# Patient Record
Sex: FEMALE | Race: WHITE | NOT HISPANIC OR LATINO | Employment: UNEMPLOYED | URBAN - METROPOLITAN AREA
[De-identification: names, ages, dates, MRNs, and addresses within clinical notes are randomized per-mention and may not be internally consistent; named-entity substitution may affect disease eponyms.]

---

## 2018-06-08 ENCOUNTER — OFFICE VISIT CONVERTED (OUTPATIENT)
Dept: FAMILY MEDICINE CLINIC | Facility: CLINIC | Age: 38
End: 2018-06-08
Attending: NURSE PRACTITIONER

## 2018-06-18 ENCOUNTER — OFFICE VISIT CONVERTED (OUTPATIENT)
Dept: FAMILY MEDICINE CLINIC | Facility: CLINIC | Age: 38
End: 2018-06-18
Attending: NURSE PRACTITIONER

## 2018-06-18 ENCOUNTER — CONVERSION ENCOUNTER (OUTPATIENT)
Dept: FAMILY MEDICINE CLINIC | Facility: CLINIC | Age: 38
End: 2018-06-18

## 2018-08-22 ENCOUNTER — OFFICE VISIT CONVERTED (OUTPATIENT)
Dept: FAMILY MEDICINE CLINIC | Facility: CLINIC | Age: 38
End: 2018-08-22
Attending: NURSE PRACTITIONER

## 2019-04-22 ENCOUNTER — HOSPITAL ENCOUNTER (OUTPATIENT)
Dept: LAB | Facility: HOSPITAL | Age: 39
Discharge: HOME | End: 2019-04-22
Attending: NURSE PRACTITIONER

## 2019-04-22 ENCOUNTER — CONVERSION ENCOUNTER (OUTPATIENT)
Dept: FAMILY MEDICINE CLINIC | Facility: CLINIC | Age: 39
End: 2019-04-22

## 2019-04-22 ENCOUNTER — OFFICE VISIT CONVERTED (OUTPATIENT)
Dept: FAMILY MEDICINE CLINIC | Facility: CLINIC | Age: 39
End: 2019-04-22
Attending: NURSE PRACTITIONER

## 2019-04-22 LAB
ALBUMIN SERPL-MCNC: 3.7 G/DL (ref 3.5–5)
ALBUMIN/GLOB SERPL: 1.1 {RATIO} (ref 1.4–2.6)
ALP SERPL-CCNC: 92 U/L (ref 42–98)
ALT SERPL-CCNC: 12 U/L (ref 10–40)
ANION GAP SERPL CALC-SCNC: 15 MMOL/L (ref 8–19)
AST SERPL-CCNC: 14 U/L (ref 15–50)
BASOPHILS # BLD AUTO: 0.04 10*3/UL (ref 0–0.2)
BASOPHILS NFR BLD AUTO: 0.7 % (ref 0–3)
BILIRUB SERPL-MCNC: 0.37 MG/DL (ref 0.2–1.3)
BUN SERPL-MCNC: 11 MG/DL (ref 5–25)
BUN/CREAT SERPL: 14 {RATIO} (ref 6–20)
CALCIUM SERPL-MCNC: 8.9 MG/DL (ref 8.7–10.4)
CHLORIDE SERPL-SCNC: 103 MMOL/L (ref 99–111)
CONV ABS IMM GRAN: 0.01 10*3/UL (ref 0–0.2)
CONV CO2: 25 MMOL/L (ref 22–32)
CONV IMMATURE GRAN: 0.2 % (ref 0–1.8)
CONV TOTAL PROTEIN: 7.1 G/DL (ref 6.3–8.2)
CREAT UR-MCNC: 0.77 MG/DL (ref 0.5–0.9)
DEPRECATED RDW RBC AUTO: 46 FL (ref 36.4–46.3)
EOSINOPHIL # BLD AUTO: 0.15 10*3/UL (ref 0–0.7)
EOSINOPHIL # BLD AUTO: 2.5 % (ref 0–7)
ERYTHROCYTE [DISTWIDTH] IN BLOOD BY AUTOMATED COUNT: 13.6 % (ref 11.7–14.4)
EST. AVERAGE GLUCOSE BLD GHB EST-MCNC: 105 MG/DL
GFR SERPLBLD BASED ON 1.73 SQ M-ARVRAT: >60 ML/MIN/{1.73_M2}
GLOBULIN UR ELPH-MCNC: 3.4 G/DL (ref 2–3.5)
GLUCOSE SERPL-MCNC: 86 MG/DL (ref 65–99)
HBA1C MFR BLD: 13.1 G/DL (ref 12–16)
HBA1C MFR BLD: 5.3 % (ref 3.5–5.7)
HCT VFR BLD AUTO: 41.2 % (ref 37–47)
LYMPHOCYTES # BLD AUTO: 2.37 10*3/UL (ref 1–5)
MCH RBC QN AUTO: 29.4 PG (ref 27–31)
MCHC RBC AUTO-ENTMCNC: 31.8 G/DL (ref 33–37)
MCV RBC AUTO: 92.4 FL (ref 81–99)
MONOCYTES # BLD AUTO: 0.27 10*3/UL (ref 0.2–1.2)
MONOCYTES NFR BLD AUTO: 4.5 % (ref 3–10)
NEUTROPHILS # BLD AUTO: 3.16 10*3/UL (ref 2–8)
NEUTROPHILS NFR BLD AUTO: 52.6 % (ref 30–85)
NRBC CBCN: 0 % (ref 0–0.7)
OSMOLALITY SERPL CALC.SUM OF ELEC: 287 MOSM/KG (ref 273–304)
PLATELET # BLD AUTO: 201 10*3/UL (ref 130–400)
PMV BLD AUTO: 10.7 FL (ref 9.4–12.3)
POTASSIUM SERPL-SCNC: 3.7 MMOL/L (ref 3.5–5.3)
RBC # BLD AUTO: 4.46 10*6/UL (ref 4.2–5.4)
SODIUM SERPL-SCNC: 139 MMOL/L (ref 135–147)
T4 FREE SERPL-MCNC: 0.9 NG/DL (ref 0.9–1.8)
TSH SERPL-ACNC: 1.87 M[IU]/L (ref 0.27–4.2)
VARIANT LYMPHS NFR BLD MANUAL: 39.5 % (ref 20–45)
WBC # BLD AUTO: 6 10*3/UL (ref 4.8–10.8)

## 2019-04-23 LAB — INSULIN SERPL-ACNC: 7 UIU/ML (ref 2.6–24.9)

## 2020-06-29 ENCOUNTER — OFFICE VISIT CONVERTED (OUTPATIENT)
Dept: FAMILY MEDICINE CLINIC | Facility: CLINIC | Age: 40
End: 2020-06-29
Attending: NURSE PRACTITIONER

## 2020-06-29 ENCOUNTER — CONVERSION ENCOUNTER (OUTPATIENT)
Dept: FAMILY MEDICINE CLINIC | Facility: CLINIC | Age: 40
End: 2020-06-29

## 2020-06-30 ENCOUNTER — HOSPITAL ENCOUNTER (OUTPATIENT)
Dept: LAB | Facility: HOSPITAL | Age: 40
Discharge: HOME | End: 2020-06-30
Attending: NURSE PRACTITIONER

## 2020-06-30 LAB
ALBUMIN SERPL-MCNC: 3.6 G/DL (ref 3.5–5)
ALBUMIN/GLOB SERPL: 1.1 {RATIO} (ref 1.4–2.6)
ALP SERPL-CCNC: 83 U/L (ref 42–98)
ALT SERPL-CCNC: 13 U/L (ref 10–40)
ANION GAP SERPL CALC-SCNC: 22 MMOL/L (ref 8–19)
AST SERPL-CCNC: 15 U/L (ref 15–50)
BASOPHILS # BLD AUTO: 0.04 10*3/UL (ref 0–0.2)
BASOPHILS NFR BLD AUTO: 0.7 % (ref 0–3)
BILIRUB SERPL-MCNC: 0.19 MG/DL (ref 0.2–1.3)
BUN SERPL-MCNC: 11 MG/DL (ref 5–25)
BUN/CREAT SERPL: 15 {RATIO} (ref 6–20)
CALCIUM SERPL-MCNC: 9.1 MG/DL (ref 8.7–10.4)
CHLORIDE SERPL-SCNC: 104 MMOL/L (ref 99–111)
CHOLEST SERPL-MCNC: 177 MG/DL (ref 107–200)
CHOLEST/HDLC SERPL: 3.9 {RATIO} (ref 3–6)
CONV ABS IMM GRAN: 0.01 10*3/UL (ref 0–0.2)
CONV CO2: 20 MMOL/L (ref 22–32)
CONV IMMATURE GRAN: 0.2 % (ref 0–1.8)
CONV TOTAL PROTEIN: 7 G/DL (ref 6.3–8.2)
CREAT UR-MCNC: 0.71 MG/DL (ref 0.5–0.9)
DEPRECATED RDW RBC AUTO: 47 FL (ref 36.4–46.3)
EOSINOPHIL # BLD AUTO: 0.14 10*3/UL (ref 0–0.7)
EOSINOPHIL # BLD AUTO: 2.3 % (ref 0–7)
ERYTHROCYTE [DISTWIDTH] IN BLOOD BY AUTOMATED COUNT: 14.4 % (ref 11.7–14.4)
FOLATE SERPL-MCNC: 6.1 NG/ML (ref 4.8–20)
GFR SERPLBLD BASED ON 1.73 SQ M-ARVRAT: >60 ML/MIN/{1.73_M2}
GLOBULIN UR ELPH-MCNC: 3.4 G/DL (ref 2–3.5)
GLUCOSE SERPL-MCNC: 97 MG/DL (ref 65–99)
HCT VFR BLD AUTO: 39.6 % (ref 37–47)
HDLC SERPL-MCNC: 45 MG/DL (ref 40–60)
HGB BLD-MCNC: 12.3 G/DL (ref 12–16)
IRON SATN MFR SERPL: 13 % (ref 20–55)
IRON SERPL-MCNC: 43 UG/DL (ref 60–170)
LDLC SERPL CALC-MCNC: 110 MG/DL (ref 70–100)
LYMPHOCYTES # BLD AUTO: 1.99 10*3/UL (ref 1–5)
LYMPHOCYTES NFR BLD AUTO: 32.4 % (ref 20–45)
MCH RBC QN AUTO: 28 PG (ref 27–31)
MCHC RBC AUTO-ENTMCNC: 31.1 G/DL (ref 33–37)
MCV RBC AUTO: 90 FL (ref 81–99)
MONOCYTES # BLD AUTO: 0.28 10*3/UL (ref 0.2–1.2)
MONOCYTES NFR BLD AUTO: 4.6 % (ref 3–10)
NEUTROPHILS # BLD AUTO: 3.68 10*3/UL (ref 2–8)
NEUTROPHILS NFR BLD AUTO: 59.8 % (ref 30–85)
NRBC CBCN: 0 % (ref 0–0.7)
OSMOLALITY SERPL CALC.SUM OF ELEC: 293 MOSM/KG (ref 273–304)
PLATELET # BLD AUTO: 247 10*3/UL (ref 130–400)
PMV BLD AUTO: 11.1 FL (ref 9.4–12.3)
POTASSIUM SERPL-SCNC: 4.1 MMOL/L (ref 3.5–5.3)
RBC # BLD AUTO: 4.4 10*6/UL (ref 4.2–5.4)
SODIUM SERPL-SCNC: 142 MMOL/L (ref 135–147)
TIBC SERPL-MCNC: 330 UG/DL (ref 245–450)
TRANSFERRIN SERPL-MCNC: 231 MG/DL (ref 250–380)
TRIGL SERPL-MCNC: 110 MG/DL (ref 40–150)
TSH SERPL-ACNC: 3.39 M[IU]/L (ref 0.27–4.2)
VIT B12 SERPL-MCNC: 418 PG/ML (ref 211–911)
VLDLC SERPL-MCNC: 22 MG/DL (ref 5–37)
WBC # BLD AUTO: 6.14 10*3/UL (ref 4.8–10.8)

## 2020-07-01 LAB — 25(OH)D3 SERPL-MCNC: 35.7 NG/ML (ref 30–100)

## 2020-07-03 LAB
CMV IGG CSF IA-ACNC: >10 U/ML
CMV IGM SERPL IA-ACNC: <8 AU/ML
CONV EBV EARLY ANTIGEN: 33.8 U/ML (ref 0–10.9)
CONV EBV NUCLEAR ANTIGEN: 143 U/ML (ref 0–21.9)
CONV EPSTEIN BARR VIRAL CAPSID IGM: <10 U/ML (ref 0–43.9)
CONV EPSTEIN BARR VIRUS ANTIBODY TO VIRAL CAPSID IGG: 496 U/ML (ref 0–21.9)

## 2021-03-19 ENCOUNTER — CONVERSION ENCOUNTER (OUTPATIENT)
Dept: FAMILY MEDICINE CLINIC | Facility: CLINIC | Age: 41
End: 2021-03-19

## 2021-03-19 ENCOUNTER — OFFICE VISIT CONVERTED (OUTPATIENT)
Dept: FAMILY MEDICINE CLINIC | Facility: CLINIC | Age: 41
End: 2021-03-19
Attending: NURSE PRACTITIONER

## 2021-04-14 ENCOUNTER — HOSPITAL ENCOUNTER (OUTPATIENT)
Dept: GENERAL RADIOLOGY | Facility: HOSPITAL | Age: 41
Discharge: HOME | End: 2021-04-14
Attending: NURSE PRACTITIONER

## 2021-04-16 ENCOUNTER — OFFICE VISIT CONVERTED (OUTPATIENT)
Dept: FAMILY MEDICINE CLINIC | Facility: CLINIC | Age: 41
End: 2021-04-16
Attending: NURSE PRACTITIONER

## 2021-05-13 NOTE — PROGRESS NOTES
Progress Note      Patient Name: Leonie Bates   Patient ID: [id number]   Sex: Female   YOB: 1980        Visit Date: June 29, 2020    Provider: GABINO Grover   Location: [facility]   Location Address: [address] [place] [address], [address] [place] [address]  [id number]   Location Phone: [phone]          Chief Complaint  · CPX  · Patient has been experiencing fatigue over the last 2-3 months.      History Of Present Illness  Leonie Bates is a 39 year old /White female who presents for evaluation and treatment of:      Presents to the office today for wellness physical.  Patient also states that she has been having generalized fatigue for the past 2 months.  Patient states that she has not had labs in over a year and like to have these completed.  Patient does state that she has been on CPAP in the past but after having her gastric sleeve surgery and losing significant mental weight she stopped utilizing it.  She states that she is waking up now still fatigued and is having to take naps throughout the day.       Past Medical History  Disease Name Date Onset Notes   Depression --  --    Eczema 06/08/2018 --    Fatigue 06/08/2018 --    Major depressive disorder 06/08/2018 --    Migraine Headaches --  --    Psychiatric Care --  --    Skin Disease --  --          Past Surgical History  Procedure Name Date Notes   Gastric Sleeve --  --    Tubal ligation --  --          Medication List  Name Date Started Instructions   clotrimazole-betamethasone 1-0.05 % topical cream 04/22/2019 apply to the affected and surrounding areas of skin by topical route 2 times per day in the morning and evening         Family Medical History  Disease Name Relative/Age Notes   Diabetes Mellitus, Type II  --          Social History  Finding Status Start/Stop Quantity Notes   Alcohol Never --/-- --  --    Tobacco Never --/-- --  --          Review of Systems  · Constitutional  o Admits  o : fatigue  o Denies  o :  "night sweats  · Eyes  o Denies  o : double vision, blurred vision  · HENT  o Denies  o : vertigo, recent head injury  · Breasts  o Denies  o : abnormal changes in breast size, additional breast symptoms except as noted in the HPI  · Cardiovascular  o Denies  o : chest pain, irregular heart beats  · Respiratory  o Denies  o : shortness of breath, productive cough  · Gastrointestinal  o Denies  o : nausea, vomiting  · Genitourinary  o Denies  o : dysuria, urinary retention  · Integument  o Denies  o : hair growth change, new skin lesions  · Neurologic  o Denies  o : altered mental status, seizures  · Musculoskeletal  o Denies  o : joint swelling, limitation of motion  · Endocrine  o Denies  o : cold intolerance, heat intolerance  · Heme-Lymph  o Denies  o : petechiae, lymph node enlargement or tenderness  · Allergic-Immunologic  o Denies  o : frequent illnesses      Vitals  Date Time BP Position Site L\R Cuff Size HR RR TEMP (F) WT  HT  BMI kg/m2 BSA m2 O2 Sat        06/29/2020 03:15 /80 Sitting    86 - R 18 97.6 271lbs 0oz 5'  4\" 46.52 2.36 96 %          Physical Examination  · Constitutional  o Appearance  o : well-nourished, in no acute distress  · Eyes  o Conjunctivae  o : conjunctivae normal  o Sclerae  o : sclerae white  o Pupils and Irises  o : pupils equal and round  o Eyelids/Ocular Adnexae  o : eyelid appearance normal, no exudates present  · Ears, Nose, Mouth and Throat  o Ears  o :   § External Ears  § : external auditory canal appearance within normal limits, no discharge present  § Otoscopic Examination  § : tympanic membrane appearance within normal limits bilaterally, cerumen not present  o Nose  o :   § External Nose  § : appearance normal  § Intranasal Exam  § : mucosa within normal limits, vestibules normal, no intranasal lesions present, septum midline, sinuses non tender to palpation  § Nasopharynx  § : no discharge present  o Oral Cavity  o :   § Oral Mucosa  § : oral mucosa " normal  § Lips  § : lip appearance normal  § Teeth  § : normal dentation for age  o Throat  o :   § Oropharynx  § : no inflammation or lesions present, tonsils within normal limits  · Neck  o Inspection/Palpation  o : normal appearance, no masses or tenderness, trachea midline  o Range of Motion  o : cervical range of motion within normal limits  o Thyroid  o : gland size normal, nontender, no nodules or masses present on palpation  · Respiratory  o Respiratory Effort  o : breathing unlabored  o Inspection of Chest  o : normal appearance  o Auscultation of Lungs  o : normal breath sounds throughout inspiration and expiration  · Cardiovascular  o Heart  o :   § Auscultation of Heart  § : regular rate and rhythm, no murmurs, gallops or rubs  o Peripheral Vascular System  o :   § Extremities  § : no clubbing or edema  · Gastrointestinal  o Abdominal Examination  o : abdomen nontender to palpation, tone normal without rigidity or guarding, no masses present, bowel sounds present in all four quadrants  · Lymphatic  o Neck  o : no lymphadenopathy present  · Musculoskeletal  o Spine  o :   § Inspection/Palpation  § : no spinal tenderness, scoliosis or kyphosis present  § Range of Motion  § : spine range of motion normal  o Right Upper Extremity  o :   § Inspection/Palpation  § : no tenderness to palpation, ROM normal  o Left Upper Extremity  o :   § Inspection/Palpation  § : no tenderness to palpation, ROM normal  o Right Lower Extremity  o :   § Inspection/Palpation  § : no joint or limb tenderness to palpation, ROM normal  o Left Lower Extremity  o :   § Inspection/Palpation  § : no joint or limb tenderness to palpation, ROM normal  · Skin and Subcutaneous Tissue  o General Inspection  o : no rashes or lesions present, no areas of discoloration  o Body Hair  o : hair normal for age, general body hair distribution normal for age  o Digits and Nails  o : no clubbing, cyanosis, deformities or edema present, normal appearing  nails  · Neurologic  o Mental Status Examination  o :   § Orientation  § : grossly oriented to person, place and time  o Gait and Station  o : normal gait, able to stand without difficulty  · Psychiatric  o Judgement and Insight  o : judgment and insight intact  o Mood and Affect  o : mood normal, affect appropriate  o Presence of Abnormal Thoughts  o : no hallucinations, no delusions present, no psychotic thoughts          Assessment  · Screening for depression     V79.0/Z13.89  · Fatigue     780.79/R53.83  · Well adult exam     V70.0/Z00.00      Plan  · Orders  o Annual depression screening, 15 minutes (, 19872) - V79.0/Z13.89 - 06/29/2020  o ACO-18: Positive screen for clinical depression using a standardized tool and a follow-up plan documented () - V79.0/Z13.89 - 06/29/2020  o Iron panel (iron, TIBC, transferrin saturation) (11755, 58942, 72184) - 780.79/R53.83 - 06/29/2020  o EBV antibody panel (39478, 36514, 03705) - 780.79/R53.83 - 06/29/2020  o CMV ab panel (IgG, IgM) (35514) - 780.79/R53.83 - 06/29/2020  o B12 Folate levels (B12FO) - 780.79/R53.83 - 06/29/2020  o Physical, Primary Care Panel (CBC, CMP, Lipid, TSH) [facility] (08069, 30574, 49497, 14704) - - 06/29/2020  o Vitamin D (25-Hydroxy) Level (03329) - - 06/29/2020  o ACO-39: Current medications updated and reviewed () - - 06/29/2020  o ACO-14: Influenza immunization was not administered for reasons documented () - - 06/29/2020  · Medications  o clotrimazole-betamethasone 1-0.05 % topical cream   SIG: apply to the affected and surrounding areas of skin by topical route 2 times per day in the morning and evening   DISP: (1) 45 gm tube with 1 refills  Refilled on 06/29/2020     o Trintellix 10 mg oral tablet   SIG: take 1 tablet (10 mg) by oral route once daily at the same time each day for 30 days   DISP: (30) tablets with 2 refills  Discontinued on 06/29/2020     o Medications have been Reconciled  o Transition of Care or Provider  Policy  · Instructions  o Depression Screen completed and scanned into the EMR under the designated folder within the patient's documents.  o Today's PHQ-9 result is ___15  o Patient was educated/instructed on their diagnosis, treatment and medications prior to discharge from the clinic today.  o Time spent with the patient was minutes, more than 50% face to face.  o Electronically Identified Patient Education Materials Provided Electronically  · Disposition  o Call or Return if symptoms worsen or persist.            Electronically Signed by: GABINO Grover -Author on June 29, 2020 03:34:33 PM

## 2021-05-14 VITALS
TEMPERATURE: 97.2 F | OXYGEN SATURATION: 98 % | WEIGHT: 260 LBS | DIASTOLIC BLOOD PRESSURE: 69 MMHG | BODY MASS INDEX: 44.39 KG/M2 | HEART RATE: 85 BPM | SYSTOLIC BLOOD PRESSURE: 114 MMHG | HEIGHT: 64 IN

## 2021-05-14 VITALS
OXYGEN SATURATION: 96 % | RESPIRATION RATE: 18 BRPM | TEMPERATURE: 96.5 F | HEIGHT: 64 IN | SYSTOLIC BLOOD PRESSURE: 100 MMHG | BODY MASS INDEX: 44.73 KG/M2 | WEIGHT: 262 LBS | HEART RATE: 89 BPM | DIASTOLIC BLOOD PRESSURE: 50 MMHG

## 2021-05-14 NOTE — PROGRESS NOTES
Progress Note      Patient Name: Leonie Bates   Patient ID: [id number]   Sex: Female   YOB: 1980        Visit Date: April 16, 2021    Provider: GABINO Logan   Location: [facility]   Location Address: [address] [place] [address], [address] [place] [address]  [id number]   Location Phone: [phone]          Chief Complaint     The patient is here for a sore throat, sinus congestion and lightheaded.       History Of Present Illness  Leonie Bates is a 40 year old /White female who presents for evaluation and treatment of:      Sore throat started 3 days ago and has been the same.  Has had some drainage that is clear but sputum is yellow and green.  Denies shortness of air or fever.  Denies loss taste and smell and takes temperature daily at work and hasn't had one.  was ill last week and received Pfizer Covid vaccine last week.   has bee using nasal spray that has helped congestion.       Past Medical History  Disease Name Date Onset Notes   Depression --  --    Eczema 06/08/2018 --    Fatigue 06/08/2018 --    Major depressive disorder 06/08/2018 --    Migraine Headaches --  --    Psychiatric Care --  --    Skin Disease --  --          Past Surgical History  Procedure Name Date Notes   Gastric Sleeve --  --    Tubal ligation --  --          Medication List  Name Date Started Instructions   diclofenac sodium 75 mg oral tablet,delayed release (/EC) 03/19/2021 take 1 tablet (75 mg) by oral route 2 times per day   sertraline 100 mg oral tablet  take 1 tablet (100 mg) by oral route once daily         Allergy List  Allergen Name Date Reaction Notes   NO KNOWN DRUG ALLERGIES --  --  --          Family Medical History  Disease Name Relative/Age Notes   Diabetes Mellitus, Type II  --          Social History  Finding Status Start/Stop Quantity Notes   Alcohol Never --/-- --  --    Tobacco Never --/-- --  --          Immunizations  NameDate Admin Mfg Trade Name Lot Number  "Route Inj VIS Given VIS Publication   COVID Cboahk1604/09/2021 NE Not Entered  NE NE 04/16/2021    Comments:    InfluenzaRefused 04/16/2021 NE Not Entered  NE NE     Comments:          Review of Systems  · Constitutional  o Denies  o : fever, fatigue, weight loss, weight gain  · HENT  o Admits  o : nasal congestion, postnasal drip, sore throat  · Cardiovascular  o Denies  o : lower extremity edema, claudication, chest pressure, palpitations  · Respiratory  o Denies  o : shortness of breath, wheezing, cough, hemoptysis, dyspnea on exertion  · Gastrointestinal  o Denies  o : nausea, vomiting, diarrhea, constipation, abdominal pain      Vitals  Date Time BP Position Site L\R Cuff Size HR RR TEMP (F) WT  HT  BMI kg/m2 BSA m2 O2 Sat FR L/min FiO2 HC       04/16/2021 01:21 /50 Sitting    89 - R 18 96.5 262lbs 0oz 5'  4\" 44.97 2.32 96 %  21%          Physical Examination  · Constitutional  o Appearance  o : well-nourished, well developed, alert, in no acute distress  · Eyes  o Conjunctivae  o : conjunctivae normal  o Sclerae  o : sclerae white  o Pupils and Irises  o : pupils equal, round, and reactive to light and accommodation bilaterally  o Corneas  o : tear film normal, no lesions present  o Eyelids/Ocular Adnexae  o : eyelid appearance normal, no exudates present, eye moisture level normal  · Ears, Nose, Mouth and Throat  o Ears  o : external ear auricle normal, otic canal normal, TM with no reddness, effusion, retraction  o Nose  o : external normal, nasal mucosa normal, turbinates normal  o Oral Cavity  o : tongue no lesion, oral mucosa normal  o Throat  o : no erythemia, exudate or lesions  · Neck  o Inspection/Palpation  o : normal appearance, no masses or tenderness, trachea midline, no enlarged cervical or supraclavicular lymphnodes palpated  o Thyroid  o : gland size normal, nontender, no nodules or masses present on palpation, thyroid motion normal during swallowing  · Respiratory  o Respiratory " Effort  o : breathing unlabored  o Inspection of Chest  o : normal appearance, no retractions  o Auscultation of Lungs  o : normal breath sounds throughout  · Cardiovascular  o Heart  o :   § Auscultation of Heart  § : regular rate and rhythm without murmur, PMI normal  o Peripheral Vascular System  o :   § Extremities  § : no cyanosis, clubbing or edema; less than 2 second refill noted  · Musculoskeletal  o General  o : No joint swelling or deformity noted. Muscle tone, strength and development grossly normal.  · Skin and Subcutaneous Tissue  o General Inspection  o : no rashes or lesions present, no areas of discoloration  · Neurologic  o Mental Status Examination  o : judgement, insight intact, modd and affect appropriate  o Motor Examination  o : strength grossly intact in all four extremities  o Gait and Station  o : normal gait, able to stand without difficulty          Assessment  · Pharyngitis, acute     462/J02.9  · Upper respiratory infection     465.9/J06.9      Plan  · Orders  o ACO-14: Influenza immunization administered or previously received [facility] () - - 04/16/2021  o ACO-39: Current medications updated and reviewed (, 1159F) - - 04/16/2021  · Medications  o Zithromax Z-Raymon 250 mg oral tablet   SIG: take 2 tablets (500 mg) by oral route once daily for 1 day then 1 tablet (250 mg) by oral route once daily for 4 days   DISP: (6) Tablet with 0 refills  Prescribed on 04/16/2021     o Medications have been Reconciled  o Transition of Care or Provider Policy  · Instructions  o Patient was educated/instructed on their diagnosis, treatment and medications prior to discharge from the clinic today.  o Minutes spent with patient including greater than 50% in Education/Counseling/Care Coordination.  o Time spent with the patient was minutes, more than 50% face to face.  · Disposition  o Call or Return if symptoms worsen or persist.            Electronically Signed by: GABINO Logan -Author on April  16, 2021 01:50:24 PM

## 2021-05-14 NOTE — PROGRESS NOTES
Progress Note      Patient Name: Leonie Bates   Patient ID: [id number]   Sex: Female   YOB: 1980        Visit Date: March 19, 2021    Provider: GABINO Grover   [place] [facility]   Location Address: [address] [place] [address], [address] [place] [address]  [id number]   Location Phone: [phone]          Chief Complaint  · right shoulder pain for 3 months      History Of Present Illness  Leonie Bates is a 40 year old /White female who presents for evaluation and treatment of:      Patient presents to the office today with complaints of right shoulder pain.  She states that this is been present for approximately 3 months.  She denies any known injury or trauma.  She states that she originally thought that she had slept on her arm wrong.  Patient states that the pain is more in the deltoid muscle and not in the joint she states that she has pain with extending her arm laterally.  She denies any swelling or redness.       Past Medical History  Disease Name Date Onset Notes   Depression --  --    Eczema 06/08/2018 --    Fatigue 06/08/2018 --    Major depressive disorder 06/08/2018 --    Migraine Headaches --  --    Psychiatric Care --  --    Skin Disease --  --          Past Surgical History  Procedure Name Date Notes   Gastric Sleeve --  --    Tubal ligation --  --          Medication List  Name Date Started Instructions   sertraline 100 mg oral tablet  take 1 tablet (100 mg) by oral route once daily         Family Medical History  Disease Name Relative/Age Notes   Diabetes Mellitus, Type II  --          Social History  Finding Status Start/Stop Quantity Notes   Alcohol Never --/-- --  --    Tobacco Never --/-- --  --          Immunizations  NameDate Admin Mfg Trade Name Lot Number Route Inj VIS Given VIS Publication   InfluenzaRefused 06/29/2020 NE Not Entered  NE NE     Comments:          Review of Systems  · Constitutional  o Denies  o : fever, fatigue, weight loss,  "weight gain  · Cardiovascular  o Denies  o : lower extremity edema, claudication, chest pressure, palpitations  · Respiratory  o Denies  o : shortness of breath, wheezing, cough, hemoptysis, dyspnea on exertion  · Gastrointestinal  o Denies  o : nausea, vomiting, diarrhea, constipation, abdominal pain  · Musculoskeletal  o Admits  o : shoulder pain      Vitals  Date Time BP Position Site L\R Cuff Size HR RR TEMP (F) WT  HT  BMI kg/m2 BSA m2 O2 Sat FR L/min FiO2 HC       03/19/2021 02:03 /69 Sitting    85 - R  97.2 260lbs 0oz 5'  4\" 44.63 2.31 98 %            Physical Examination  · Constitutional  o Appearance  o : well-nourished, in no acute distress  · Neck  o Inspection/Palpation  o : normal appearance, no masses or tenderness, trachea midline  o Range of Motion  o : cervical range of motion within normal limits  o Thyroid  o : gland size normal, nontender, no nodules or masses present on palpation  · Respiratory  o Respiratory Effort  o : breathing unlabored  o Inspection of Chest  o : normal appearance  o Auscultation of Lungs  o : normal breath sounds throughout inspiration and expiration  · Cardiovascular  o Heart  o :   § Auscultation of Heart  § : regular rate and rhythm, no murmurs, gallops or rubs  o Peripheral Vascular System  o :   § Extremities  § : no clubbing or edema  · Skin and Subcutaneous Tissue  o General Inspection  o : no rashes or lesions present, no areas of discoloration  o Body Hair  o : hair normal for age, general body hair distribution normal for age  o Digits and Nails  o : no clubbing, cyanosis, deformities or edema present, normal appearing nails  · Neurologic  o Mental Status Examination  o :   § Orientation  § : grossly oriented to person, place and time  o Gait and Station  o : normal gait, able to stand without difficulty  · Psychiatric  o Judgement and Insight  o : judgment and insight intact  o Mood and Affect  o : mood normal, affect appropriate  o Presence of Abnormal " Thoughts  o : no hallucinations, no delusions present, no psychotic thoughts  · Right Shoulder  o Inspection  o : no abnormalities, redness, swelling, scarring or atrophy  o Palpation  o : deltoid tenderness noted with palpitation, no edema, no erythrema  o Range of Motion  o : normal range of motion  o Neurovascular  o : neurovasularly intact including biceps and triceps reflex and distal pulses  o Strength  o : subscapularis, infraspinatus, supraspinatus, and biceps strength all normal          Assessment  · Visit for screening mammogram     V76.12/Z12.31  · Shoulder pain, right     719.41/M25.511      Plan  · Orders  o Screening Mammography; Bilateral 3D (94159, , 82393) - V76.12/Z12.31 - 03/19/2021  o ACO-39: Current medications updated and reviewed (, 1159F) - - 03/19/2021  · Medications  o diclofenac sodium 75 mg oral tablet,delayed release (DR/EC)   SIG: take 1 tablet (75 mg) by oral route 2 times per day   DISP: (60) Tablet with 1 refills  Prescribed on 03/19/2021     o Medications have been Reconciled  o Transition of Care or Provider Policy  · Instructions  o Patient was educated/instructed on their diagnosis, treatment and medications prior to discharge from the clinic today.  o Minutes spent with patient including greater than 50% in Education/Counseling/Care Coordination.  o Time spent with the patient was minutes, more than 50% face to face.  o Electronically Identified Patient Education Materials Provided Electronically     The patient contact the office in 2 to 3 weeks to let us know how she was doing.  I explained that if the shoulder continues to hurt we would consider referring her to physical therapy for evaluation.             Electronically Signed by: GABINO Grover -Author on March 19, 2021 02:17:46 PM

## 2021-05-15 VITALS
HEART RATE: 78 BPM | SYSTOLIC BLOOD PRESSURE: 114 MMHG | BODY MASS INDEX: 38.07 KG/M2 | RESPIRATION RATE: 16 BRPM | DIASTOLIC BLOOD PRESSURE: 78 MMHG | TEMPERATURE: 97.6 F | OXYGEN SATURATION: 100 % | WEIGHT: 223 LBS | HEIGHT: 64 IN

## 2021-05-15 VITALS
RESPIRATION RATE: 18 BRPM | DIASTOLIC BLOOD PRESSURE: 80 MMHG | SYSTOLIC BLOOD PRESSURE: 120 MMHG | WEIGHT: 271 LBS | OXYGEN SATURATION: 96 % | HEIGHT: 64 IN | TEMPERATURE: 97.6 F | BODY MASS INDEX: 46.26 KG/M2 | HEART RATE: 86 BPM

## 2021-05-16 VITALS
HEIGHT: 64 IN | DIASTOLIC BLOOD PRESSURE: 79 MMHG | SYSTOLIC BLOOD PRESSURE: 123 MMHG | RESPIRATION RATE: 16 BRPM | TEMPERATURE: 97.5 F | OXYGEN SATURATION: 99 % | HEART RATE: 83 BPM | WEIGHT: 202.31 LBS | BODY MASS INDEX: 34.54 KG/M2

## 2021-05-16 VITALS
RESPIRATION RATE: 16 BRPM | HEIGHT: 64 IN | HEART RATE: 78 BPM | TEMPERATURE: 97.8 F | WEIGHT: 207 LBS | SYSTOLIC BLOOD PRESSURE: 128 MMHG | BODY MASS INDEX: 35.34 KG/M2 | DIASTOLIC BLOOD PRESSURE: 78 MMHG | OXYGEN SATURATION: 99 %

## 2021-05-16 VITALS
BODY MASS INDEX: 35.68 KG/M2 | RESPIRATION RATE: 16 BRPM | DIASTOLIC BLOOD PRESSURE: 75 MMHG | WEIGHT: 209 LBS | OXYGEN SATURATION: 95 % | SYSTOLIC BLOOD PRESSURE: 122 MMHG | HEART RATE: 83 BPM | HEIGHT: 64 IN | TEMPERATURE: 97.4 F

## 2021-07-22 ENCOUNTER — OFFICE VISIT (OUTPATIENT)
Dept: FAMILY MEDICINE CLINIC | Facility: CLINIC | Age: 41
End: 2021-07-22

## 2021-07-22 ENCOUNTER — TELEPHONE (OUTPATIENT)
Dept: FAMILY MEDICINE CLINIC | Facility: CLINIC | Age: 41
End: 2021-07-22

## 2021-07-22 VITALS
BODY MASS INDEX: 44.05 KG/M2 | TEMPERATURE: 96.8 F | HEIGHT: 64 IN | HEART RATE: 106 BPM | RESPIRATION RATE: 16 BRPM | SYSTOLIC BLOOD PRESSURE: 102 MMHG | OXYGEN SATURATION: 97 % | DIASTOLIC BLOOD PRESSURE: 82 MMHG | WEIGHT: 258 LBS

## 2021-07-22 DIAGNOSIS — D50.9 IRON DEFICIENCY ANEMIA, UNSPECIFIED IRON DEFICIENCY ANEMIA TYPE: ICD-10-CM

## 2021-07-22 DIAGNOSIS — F52.0 HYPOACTIVE SEXUAL DESIRE DISORDER: Primary | ICD-10-CM

## 2021-07-22 DIAGNOSIS — Z79.899 MEDICATION MANAGEMENT: ICD-10-CM

## 2021-07-22 DIAGNOSIS — L30.9 DERMATITIS: ICD-10-CM

## 2021-07-22 DIAGNOSIS — F31.9 BIPOLAR AFFECTIVE DISORDER, REMISSION STATUS UNSPECIFIED (HCC): ICD-10-CM

## 2021-07-22 PROBLEM — L98.9 SKIN DISEASE: Status: ACTIVE | Noted: 2021-07-22

## 2021-07-22 PROBLEM — F32.A DEPRESSION: Status: ACTIVE | Noted: 2018-06-08

## 2021-07-22 PROBLEM — R53.83 FATIGUE: Status: ACTIVE | Noted: 2018-06-08

## 2021-07-22 PROCEDURE — 99214 OFFICE O/P EST MOD 30 MIN: CPT | Performed by: NURSE PRACTITIONER

## 2021-07-22 RX ORDER — FLIBANSERIN 100 MG/1
TABLET, FILM COATED ORAL
COMMUNITY
Start: 2021-06-22 | End: 2021-07-22 | Stop reason: SDUPTHER

## 2021-07-22 RX ORDER — FLIBANSERIN 100 MG/1
100 TABLET, FILM COATED ORAL DAILY
Qty: 30 TABLET | Refills: 5 | Status: SHIPPED | OUTPATIENT
Start: 2021-07-22 | End: 2021-10-27

## 2021-07-22 RX ORDER — DICLOFENAC SODIUM 75 MG/1
TABLET, DELAYED RELEASE ORAL
COMMUNITY
Start: 2021-06-04

## 2021-07-22 RX ORDER — SERTRALINE HYDROCHLORIDE 100 MG/1
TABLET, FILM COATED ORAL
COMMUNITY
End: 2021-09-08

## 2021-07-22 RX ORDER — CLOTRIMAZOLE AND BETAMETHASONE DIPROPIONATE 10; .64 MG/G; MG/G
CREAM TOPICAL 2 TIMES DAILY
Qty: 30 G | Refills: 0 | Status: SHIPPED | OUTPATIENT
Start: 2021-07-22 | End: 2021-09-08 | Stop reason: SDUPTHER

## 2021-07-22 NOTE — PROGRESS NOTES
Chief Complaint  Depression (f/u) and Med Refill    Subjective        Leonie Bates presents to [facility]  Was on sertraline and vraylar and ran out of vraylar then ran out of sertraline.  Had a bad month in February.  And restarted sertraline but need pa on Vraylar.  Was seeing medication management and diagnosed with bipolar.    Hypoactive desire disorder.  Addyi working really well.  Has noticed a difference.      States was on a cream for dermatitis of feet that needs refill of.      DepressionPatient is not experiencing: palpitations and shortness of breath.          Past History:    Medical History: has a past medical history of Depression, Eczema (06/08/2018), Fatigue (06/08/2018), H/O psychiatric care, Major depressive disorder (06/08/2018), Migraine headache, and Skin disease.     Surgical History: has a past surgical history that includes Gastric Sleeve and Tubal ligation.     Family History: family history includes Diabetes type II in an other family member.     Social History: reports that she has never smoked. She has never used smokeless tobacco. She reports that she does not drink alcohol and does not use drugs.    Allergies: Patient has no known allergies.          Current Outpatient Medications:   •  Addyi 100 MG tablet, Take 100 mg by mouth Daily., Disp: 30 tablet, Rfl: 5  •  diclofenac (VOLTAREN) 75 MG EC tablet, , Disp: , Rfl:   •  sertraline (ZOLOFT) 100 MG tablet, sertraline 100 mg oral tablet take 1 tablet (100 mg) by oral route once daily   Active, Disp: , Rfl:   •  Cariprazine HCl (VRAYLAR) 1.5 MG capsule capsule, Take 1 capsule by mouth Daily., Disp: 30 capsule, Rfl: 5  •  clotrimazole-betamethasone (LOTRISONE) 1-0.05 % cream, Apply  topically to the appropriate area as directed 2 (Two) Times a Day., Disp: 30 g, Rfl: 0    Medications Discontinued During This Encounter   Medication Reason   • Addyi 100 MG tablet Reorder         Review of Systems  "  Constitutional: Negative for fever.   Respiratory: Negative for cough and shortness of breath.    Cardiovascular: Negative for chest pain, palpitations and leg swelling.   Neurological: Negative for dizziness, weakness, numbness and headache.        Objective         Vitals:    07/22/21 1419   BP: 102/82   BP Location: Right arm   Patient Position: Sitting   Cuff Size: Large Adult   Pulse: 106   Resp: 16   Temp: 96.8 °F (36 °C)   TempSrc: Infrared   SpO2: 97%   Weight: 117 kg (258 lb)   Height: 162.6 cm (64\")     Body mass index is 44.29 kg/m².         Physical Exam  Vitals reviewed.   Constitutional:       Appearance: Normal appearance. She is well-developed.   HENT:      Head: Normocephalic and atraumatic.      Mouth/Throat:      Pharynx: No oropharyngeal exudate.   Eyes:      Conjunctiva/sclera: Conjunctivae normal.      Pupils: Pupils are equal, round, and reactive to light.   Cardiovascular:      Rate and Rhythm: Normal rate and regular rhythm.      Heart sounds: No murmur heard.   No friction rub. No gallop.    Pulmonary:      Effort: Pulmonary effort is normal.      Breath sounds: Normal breath sounds. No wheezing or rhonchi.   Skin:     General: Skin is warm and dry.   Neurological:      Mental Status: She is alert and oriented to person, place, and time.   Psychiatric:         Mood and Affect: Mood and affect normal.         Behavior: Behavior normal.         Thought Content: Thought content normal.         Judgment: Judgment normal.             Result Review :               Assessment and Plan     Diagnoses and all orders for this visit:    1. Hypoactive sexual desire disorder (Primary)  Comments:  continue this dose since it works well  Orders:  -     Addyi 100 MG tablet; Take 100 mg by mouth Daily.  Dispense: 30 tablet; Refill: 5    2. Bipolar affective disorder, remission status unspecified (CMS/[facility])  Comments:  will attempt to restart  Orders:  -     Cariprazine HCl (VRAYLAR) 1.5 MG capsule capsule; " Take 1 capsule by mouth Daily.  Dispense: 30 capsule; Refill: 5    3. Medication management  -     Comprehensive metabolic panel; Future  -     Urinalysis With Culture If Indicated -; Future    4. Iron deficiency anemia, unspecified iron deficiency anemia type  -     Iron and TIBC; Future  -     CBC No Differential; Future    5. Dermatitis  -     clotrimazole-betamethasone (LOTRISONE) 1-0.05 % cream; Apply  topically to the appropriate area as directed 2 (Two) Times a Day.  Dispense: 30 g; Refill: 0              Follow Up     Return in about 6 months (around 1/22/2022).    Patient was given instructions and counseling regarding her condition or for health maintenance advice. Please see specific information pulled into the AVS if appropriate.

## 2021-07-22 NOTE — PATIENT INSTRUCTIONS
Bipolar 2 Disorder  Bipolar 2 disorder is a mental health disorder in which a person has episodes of emotional highs and episodes of emotional lows, or depression. In bipolar 2 disorder, the episodes of emotional highs are less extreme and do not last as long as in bipolar 1 disorder. These highs are called hypomania.  People with bipolar 2 disorder have had at least one episode of hypomania (hypomanic episode) in their lives, which is usually followed by a depressive episode. Some people may have cycles of hypomanic and depressive episodes. Some people with bipolar 2 disorder may lead a very normal life between episodes.  What are the causes?  The cause of this condition is not known.  What increases the risk?  The following factors may make you more likely to develop this condition:  · Having a family member with the disorder.  · Having an imbalance of certain chemicals in the brain (neurotransmitters).  · Experiencing stress, such as illness, divorce, financial problems, or a death.  · Having certain conditions that affect the brain or spinal cord (neurologic conditions).  · Having had a brain injury (trauma).  What are the signs or symptoms?  Symptoms of hypomania include:  · Very high self-esteem or self-confidence.  · Decreased need for sleep.  · Unusual talkativeness. Speech may be very fast.  · Racing thoughts, with quick shifts between topics that may or may not be related (flight of ideas).  · Change in ability to concentrate. Some people may have better focus, and others may not be able to focus at all.  · Increased agitation. This could be pacing, squirming, fidgeting, or finger and toe tapping.  · Impulsive behavior and poor judgment. This may result in high-risk activities, such as:  ? Being sexual with people you normally wouldn't be sexual with.  ? Spending money you have borrowed on things you don't need.  Symptoms of depression include:  · Extreme degrees of sadness, uncontrollable crying,  hopelessness, worthlessness, or numbness.  · Sleep problems, such as insomnia, waking early, or sleeping too much.  · No longer enjoying things you used to enjoy.  · Isolation. You may often spend time alone.  · Lack of energy or moving more slowly than normal.  · Trouble making decisions.  · Changes in appetite, such as eating too much or not eating.  · Thoughts of death, or wanting to harm yourself.  Sometimes, you may have a mix of symptoms of hypomania and depression at the same time. Stress can often trigger these symptoms.  How is this diagnosed?  This condition may be diagnosed based on:  · Emotional episodes.  · Medical history.  · Use of alcohol, drugs, and prescription medicines. Certain medical conditions and substances can cause symptoms that seem like bipolar disorder. This is called secondary bipolar disorder.  Your health care provider may ask you to take a short test. This helps to understand your symptoms. You may also be asked to see a mental health specialist for further evaluation or to start treatment.  How is this treated?         This condition is a long-term (chronic) illness. It is often managed with ongoing treatment rather than treatment only when symptoms occur. A combination of treatments is the main approach. Treatment may include:  · Psychotherapy. Some forms of talk therapy, such as cognitive behavioral therapy (CBT) and family therapy, can help with learning to manage bipolar disorder.  · Psychoeducation. This helps you and others understand how this disorder is managed. Include friends and family in educational sessions so they learn how best to support you.  · Methods of managing your condition, such as journaling or relaxation exercises. Relaxation exercises include:  ? Yoga.  ? Meditation.  ? Deep breathing.  · Lifestyle changes, such as:  ? Limiting alcohol and drug use.  ? Exercising regularly.  ? Structuring when you go to bed and when you get up.  ? Eating a healthy  diet.  · Medicine. Medicine can be prescribed by a health care provider who specializes in treating mental health disorders (psychiatrist). Medicines called mood stabilizers are usually prescribed. If symptoms occur during treatment with a mood stabilizer, other medicines may be added.  Follow these instructions at home:  Activity  · Return to your normal activities as told by your health care provider.  · Find activities that you enjoy, and make time to do them.  · Exercise regularly as told by your health care provider.  Lifestyle    · Follow a set daily schedule.  · Eat a healthy diet that includes fresh fruits and vegetables, whole grains, low-fat dairy, and lean meat.  · Get at least 7-8 hours of sleep each night.  · Avoid using products that contain nicotine or tobacco. If you want help quitting, ask your health care provider.  · Do not use drugs.  Alcohol use  · Do not drink alcohol if:  ? Your health care provider tells you not to drink.  ? You are pregnant, may be pregnant, or are planning to become pregnant.  · If you drink alcohol:  ? Limit how much you use to:  § 0-1 drink a day for women.  § 0-2 drinks a day for men.  ? Be aware of how much alcohol is in your drink. In the [place] one drink equals one 12 oz bottle of beer (355 mL), one 5 oz glass of wine (148 mL), or one 1½ oz glass of hard liquor (44 mL).  General instructions  · Take over-the-counter and prescription medicines only as told by your health care provider. You may think about stopping your medicine, but it is very important to take your medicine as prescribed.  · Consider joining a support group. Your health care provider may be able to recommend one.  · Talk with your family and friends about your treatment goals and how they can help.  · Keep all follow-up visits as told by your health care provider. This is important.  Where to find more information  · National [place] on Mental Illness: www.sameer.org  · National [place] of Mental  Health: www.[facility].gov  Contact a health care provider if:  · Your symptoms get worse, or your loved ones tell you that your symptoms are getting worse.  · You have uncomfortable side effects from your medicine.  · You have trouble sleeping.  · You have trouble doing daily activities.  · You feel unsafe in your surroundings.  · You are self-medicating with alcohol or drugs.  Get help right away if:  · You have new symptoms.  · You have thoughts about harming yourself or others.  · You are considering suicide.  If you ever feel like you may hurt yourself or others, or have thoughts about taking your own life, get help right away. You can go to your nearest emergency department or call:  · Your local emergency services (911 in the [place]  · A suicide crisis helpline, such as the National Suicide Prevention Lifeline at 1-639.238.8178. This is open 24 hours a day.  Summary  · Bipolar 2 disorder is a lifelong mental health disorder in which a person has episodes of hypomania and depression.  · This disorder is mainly treated with a combination of talk therapy, education, strategies for managing the condition, and medicines.  · Talk with your family and friends about your treatment goals and how they can help.  · Get help right away if you are considering suicide.  This information is not intended to replace advice given to you by your health care provider. Make sure you discuss any questions you have with your health care provider.  Document Revised: 06/02/2020 Document Reviewed: 06/02/2020  Elsevier Patient Education © 2021 [employer] Inc.

## 2021-07-22 NOTE — TELEPHONE ENCOUNTER
Caller: Leonie Bates    Relationship: Self    Best call back number: [phone]    What is the best time to reach you: ANYTIME    Who are you requesting to speak with (clinical staff, provider,  specific staff member): CLINICAL      What was the call regarding:  PATIENT WAS SEEN TODAY AND THE MEDICATION SHE PRESCRIBED TODAY IS $80.00 PLEASE LOOK AT OTHER OPTIONS    Do you require a callback: YES

## 2021-07-26 ENCOUNTER — TELEPHONE (OUTPATIENT)
Dept: FAMILY MEDICINE CLINIC | Facility: CLINIC | Age: 41
End: 2021-07-26

## 2021-07-26 DIAGNOSIS — F31.9 BIPOLAR AFFECTIVE DISORDER, REMISSION STATUS UNSPECIFIED (HCC): ICD-10-CM

## 2021-07-26 NOTE — TELEPHONE ENCOUNTER
Caller: Leonie Bates    Relationship: Self    Best call back number: [phone] (H)    What test/procedure requested: PRIOR AUTHORIZATION FOR Addyi 100 MG tablet AND Cariprazine HCl (VRAYLAR) 1.5 MG capsule capsule    When is it needed:ASAP        Additional information or concerns: PATIENT STATES THAT THE PRIOR AUTHORIZATION CAN TAKE UP TO TWO WEEK. PATIENT IS REQUESTING FOR STAFF TO CONTACT [employer] TO SPEAK WITH THEM. PATIENT STATES SHE AS ALREADY SPOKEN WITH THIS COMPANY AND THE REPRESENTATIVE TOLD HER THAT THEY WOULD NEED TO SPEAK WITH A STAFF MEMBER TO HELP SPEED UP THE PROCESS.      THE PHONE NUMBER IS [phone]    PLEASE CONTACT PATIENT AS WELL

## 2021-09-08 DIAGNOSIS — L30.9 DERMATITIS: ICD-10-CM

## 2021-09-08 DIAGNOSIS — F31.9 BIPOLAR AFFECTIVE DISORDER, REMISSION STATUS UNSPECIFIED (HCC): ICD-10-CM

## 2021-09-08 RX ORDER — SERTRALINE HYDROCHLORIDE 100 MG/1
TABLET, FILM COATED ORAL
Qty: 30 TABLET | Refills: 1 | Status: SHIPPED | OUTPATIENT
Start: 2021-09-08 | End: 2021-11-05

## 2021-09-08 RX ORDER — CLOTRIMAZOLE AND BETAMETHASONE DIPROPIONATE 10; .64 MG/G; MG/G
CREAM TOPICAL 2 TIMES DAILY
Qty: 30 G | Refills: 0 | Status: SHIPPED | OUTPATIENT
Start: 2021-09-08

## 2021-09-08 NOTE — TELEPHONE ENCOUNTER
Not sure if she is supposed to be on vraylar and zoloft. I didn't see where she was taken off, but wanted you to see if one needs to be discontinued.

## 2021-09-08 NOTE — TELEPHONE ENCOUNTER
Caller: Leonie Bates    Relationship: Self    Best call back number: [phone]     Medication needed:   Requested Prescriptions     Pending Prescriptions Disp Refills   • sertraline (ZOLOFT) 100 MG tablet [Pharmacy Med Name: SERTRALINE 100MG TABLETS] 30 tablet      Sig: TAKE 1 TABLET BY MOUTH DAILY   • Cariprazine HCl (VRAYLAR) 1.5 MG capsule capsule 21 capsule 0     Sig: Take 1 capsule by mouth Daily.   • clotrimazole-betamethasone (LOTRISONE) 1-0.05 % cream 30 g 0     Sig: Apply  topically to the appropriate area as directed 2 (Two) Times a Day.       When do you need the refill by:09/08/21    What additional details did the patient provide when requesting the medication: PATIENT REQUESTING A LESS EXPENSIVE OPTION THAN VRAYLAR AS IT IS VERY EXPENSIVE FOR HER    Does the patient have less than a 3 day supply:  [x] Yes  [] No    What is the patient's preferred pharmacy: [id number]  [place], [place] - [address] AT [facility] [phone] [facility] [phone]

## 2021-09-11 ENCOUNTER — HOSPITAL ENCOUNTER (EMERGENCY)
Facility: HOSPITAL | Age: 41
Discharge: HOME | End: 2021-09-11
Attending: EMERGENCY MEDICINE | Admitting: EMERGENCY MEDICINE

## 2021-09-11 VITALS
DIASTOLIC BLOOD PRESSURE: 61 MMHG | HEIGHT: 64 IN | WEIGHT: 253.97 LBS | HEART RATE: 90 BPM | RESPIRATION RATE: 16 BRPM | OXYGEN SATURATION: 99 % | TEMPERATURE: 98.2 F | BODY MASS INDEX: 43.36 KG/M2 | SYSTOLIC BLOOD PRESSURE: 114 MMHG

## 2021-09-11 DIAGNOSIS — M25.552 ACUTE HIP PAIN, LEFT: Primary | ICD-10-CM

## 2021-09-11 PROCEDURE — 25010000002 DEXAMETHASONE PER 1 MG: Performed by: NURSE PRACTITIONER

## 2021-09-11 PROCEDURE — 96372 THER/PROPH/DIAG INJ SC/IM: CPT

## 2021-09-11 PROCEDURE — 25010000002 KETOROLAC TROMETHAMINE PER 15 MG: Performed by: NURSE PRACTITIONER

## 2021-09-11 PROCEDURE — 99282 EMERGENCY DEPT VISIT SF MDM: CPT

## 2021-09-11 RX ORDER — DEXAMETHASONE SODIUM PHOSPHATE 10 MG/ML
10 INJECTION INTRAMUSCULAR; INTRAVENOUS ONCE
Status: COMPLETED | OUTPATIENT
Start: 2021-09-11 | End: 2021-09-11

## 2021-09-11 RX ORDER — CYCLOBENZAPRINE HCL 10 MG
10 TABLET ORAL 3 TIMES DAILY PRN
Qty: 12 TABLET | Refills: 0 | Status: SHIPPED | OUTPATIENT
Start: 2021-09-11 | End: 2022-08-09 | Stop reason: ALTCHOICE

## 2021-09-11 RX ORDER — KETOROLAC TROMETHAMINE 30 MG/ML
30 INJECTION, SOLUTION INTRAMUSCULAR; INTRAVENOUS ONCE
Status: COMPLETED | OUTPATIENT
Start: 2021-09-11 | End: 2021-09-11

## 2021-09-11 RX ADMIN — DEXAMETHASONE SODIUM PHOSPHATE 10 MG: 10 INJECTION INTRAMUSCULAR; INTRAVENOUS at 17:06

## 2021-09-11 RX ADMIN — KETOROLAC TROMETHAMINE 30 MG: 30 INJECTION, SOLUTION INTRAMUSCULAR; INTRAVENOUS at 17:06

## 2021-09-11 NOTE — ED PROVIDER NOTES
Subjective     History provided by:  Patient  Hip Pain  Location:  Left hip  Quality:  Ache  Severity:  Moderate  Onset quality:  Sudden  Duration:  1 day  Timing:  Constant  Progression:  Unchanged  Chronicity:  Recurrent  Context:  Pt reports she started a new job and has been lifting a lot. She reports left hip pain in the past that usually goes away with rest but this time it is not going away  Relieved by:  Nothing  Worsened by:  Nothing  Ineffective treatments:  None tried  Associated symptoms: no abdominal pain, no chest pain, no congestion, no cough, no diarrhea, no ear pain, no fatigue, no fever, no headaches, no loss of consciousness, no myalgias, no nausea, no rash, no rhinorrhea, no shortness of breath, no sore throat, no vomiting and no wheezing        Review of Systems   Constitutional: Negative for chills, fatigue and fever.   HENT: Negative for congestion, ear pain, rhinorrhea and sore throat.    Eyes: Negative for pain.   Respiratory: Negative for cough, chest tightness, shortness of breath and wheezing.    Cardiovascular: Negative for chest pain.   Gastrointestinal: Negative for abdominal pain, diarrhea, nausea and vomiting.   Genitourinary: Negative for flank pain and hematuria.   Musculoskeletal: Positive for arthralgias. Negative for joint swelling and myalgias.   Skin: Negative for pallor and rash.   Neurological: Negative for seizures, loss of consciousness and headaches.   All other systems reviewed and are negative.      Past Medical History:   Diagnosis Date   • Depression    • Eczema 06/08/2018   • Fatigue 06/08/2018   • H/O psychiatric care    • Major depressive disorder 06/08/2018   • Migraine headache    • Skin disease        No Known Allergies    Past Surgical History:   Procedure Laterality Date   • GASTRIC SLEEVE LAPAROSCOPIC     • TUBAL ABDOMINAL LIGATION         Family History   Problem Relation Age of Onset   • Diabetes type II Other        Social History     Socioeconomic History    • Marital status:      Spouse name: Not on file   • Number of children: Not on file   • Years of education: Not on file   • Highest education level: Not on file   Tobacco Use   • Smoking status: Never Smoker   • Smokeless tobacco: Never Used   Vaping Use   • Vaping Use: Never used   Substance and Sexual Activity   • Alcohol use: Never   • Drug use: Never   • Sexual activity: Not Currently           Objective   Physical Exam  Vitals and nursing note reviewed.   Constitutional:       General: She is not in acute distress.     Appearance: Normal appearance. She is not toxic-appearing.   HENT:      Head: Normocephalic and atraumatic.      Mouth/Throat:      Mouth: Mucous membranes are moist.   Eyes:      Extraocular Movements: Extraocular movements intact.      Pupils: Pupils are equal, round, and reactive to light.   Cardiovascular:      Rate and Rhythm: Normal rate and regular rhythm.      Pulses: Normal pulses.      Heart sounds: Normal heart sounds.   Pulmonary:      Effort: Pulmonary effort is normal. No respiratory distress.      Breath sounds: Normal breath sounds.   Abdominal:      General: Abdomen is flat.      Palpations: Abdomen is soft.      Tenderness: There is no abdominal tenderness.   Musculoskeletal:         General: Normal range of motion.      Cervical back: Normal range of motion and neck supple.      Left hip: No deformity, tenderness or crepitus. Normal range of motion. Normal strength.      Left foot: Normal capillary refill. Normal pulse.   Skin:     General: Skin is warm and dry.   Neurological:      Mental Status: She is alert and oriented to person, place, and time. Mental status is at baseline.         Procedures           ED Course                                           MDM  Number of Diagnoses or Management Options  Diagnosis management comments: I have spoken with the patient. I have explained the patient´s condition, diagnoses and treatment plan based on the information  available to me at this time. I have answered the patient's questions and addressed any concerns. The patient has a good  understanding of the patient´s diagnosis, condition, and treatment plan as can be expected at this point. The vital signs have been stable. The patient´s condition is stable and appropriate for discharge from the emergency department.      The patient will pursue further outpatient evaluation with the primary care physician or other designated or consulting physician as outlined in the discharge instructions. They are agreeable to this plan of care and follow-up instructions have been explained in detail. The patient has received these instructions in written format and have expressed an understanding of the discharge instructions. The patient is aware that any significant change in condition or worsening of symptoms should prompt an immediate return to this or the closest emergency department or call to 1.    Risk of Complications, Morbidity, and/or Mortality  Presenting problems: minimal  Diagnostic procedures: minimal  Management options: minimal    Patient Progress  Patient progress: stable      Final diagnoses:   Acute hip pain, left       ED Disposition  ED Disposition     ED Disposition Condition Comment    Discharge Stable           Eliseo Al, APRN  [address] [facility] [address]  [place] [address]  [phone]    In 3 days  As needed         Medication List      New Prescriptions    cyclobenzaprine 10 MG tablet  Commonly known as: FLEXERIL  Take 1 tablet by mouth 3 (Three) Times a Day As Needed for Muscle Spasms.           Where to Get Your Medications      These medications were sent to [facility] #[id number] - [place], [place] - [address] AT [facility] - [phone] [facility] [phone] FX  [address] [place] [address]    Hours: 24-hours Phone: [phone]   · cyclobenzaprine 10 MG tablet          Siva Branham, APRN  09/11/21  1857

## 2021-09-17 ENCOUNTER — OFFICE VISIT (OUTPATIENT)
Dept: ORTHOPEDIC SURGERY | Facility: CLINIC | Age: 41
End: 2021-09-17

## 2021-09-17 VITALS — HEIGHT: 64 IN | WEIGHT: 253 LBS | BODY MASS INDEX: 43.19 KG/M2 | RESPIRATION RATE: 16 BRPM

## 2021-09-17 DIAGNOSIS — M25.552 LEFT HIP PAIN: Primary | ICD-10-CM

## 2021-09-17 DIAGNOSIS — S39.012A STRAIN OF LUMBAR REGION, INITIAL ENCOUNTER: ICD-10-CM

## 2021-09-17 DIAGNOSIS — M47.896 OTHER OSTEOARTHRITIS OF SPINE, LUMBAR REGION: ICD-10-CM

## 2021-09-17 PROBLEM — M47.9 SPINAL OSTEOARTHRITIS: Status: ACTIVE | Noted: 2021-09-17

## 2021-09-17 PROCEDURE — 99203 OFFICE O/P NEW LOW 30 MIN: CPT | Performed by: ORTHOPAEDIC SURGERY

## 2021-09-17 RX ORDER — TRAMADOL HYDROCHLORIDE 50 MG/1
50 TABLET ORAL EVERY 4 HOURS PRN
Qty: 30 TABLET | Refills: 0 | Status: SHIPPED | OUTPATIENT
Start: 2021-09-17 | End: 2022-08-15 | Stop reason: ALTCHOICE

## 2021-09-17 RX ORDER — METHYLPREDNISOLONE 4 MG/1
TABLET ORAL
Qty: 21 TABLET | Refills: 0 | Status: SHIPPED | OUTPATIENT
Start: 2021-09-17 | End: 2021-10-27

## 2021-09-17 NOTE — PROGRESS NOTES
"Chief Complaint  Pain of the Left Hip     Subjective      Leonie Bates presents to [facility] for evaluation of the left hip. She reports pain to the left part of her low back to the top of her buttocks. She reports she has had the pain for years but it has worsened in the last week. She was seen in the ER and given a steroid shot and prescribed medication. She reports the pain does not radiate. She denies groin pain or lateral hip pain. She has no other complaints. She has taken diclofenac and a muscle relaxer.      No Known Allergies     Social History     Socioeconomic History   • Marital status:      Spouse name: Not on file   • Number of children: Not on file   • Years of education: Not on file   • Highest education level: Not on file   Tobacco Use   • Smoking status: Never Smoker   • Smokeless tobacco: Never Used   Vaping Use   • Vaping Use: Never used   Substance and Sexual Activity   • Alcohol use: Never   • Drug use: Never   • Sexual activity: Not Currently        Review of Systems     Objective   Vital Signs:   Resp 16   Ht 162.6 cm (64\")   Wt 115 kg (253 lb)   BMI 43.43 kg/m²       Physical Exam  Constitutional:       Appearance: Normal appearance. The patient is well-developed and normal weight.   HENT:      Head: Normocephalic.      Right Ear: Hearing and external ear normal.      Left Ear: Hearing and external ear normal.      Nose: Nose normal.   Eyes:      Conjunctiva/sclera: Conjunctivae normal.   Cardiovascular:      Rate and Rhythm: Normal rate.   Pulmonary:      Effort: Pulmonary effort is normal.      Breath sounds: No wheezing or rales.   Abdominal:      Palpations: Abdomen is soft.      Tenderness: There is no abdominal tenderness.   Musculoskeletal:      Cervical back: Normal range of motion.   Skin:     Findings: No rash.   Neurological:      Mental Status: The patient is alert and oriented to person, place, and time.   Psychiatric:         Mood and " Affect: Mood and affect normal.         Judgment: Judgment normal.       Ortho Exam      Left hip- non-tender. No midline tenderness. Pain over posterior hip but non-tender on exam. Pain with straight leg raise. Pain with hip flexion, flexion 95. External Rotation 55. Internal rotation 35. Full extension. Negative xu. Neurovascularly intact.     Procedures    X-Ray Report:  Left hip(s) X-Ray  Indication: Evaluation of left hip pain  AP and Lateral view(s)  Findings: no acute fracture or signs of arthritic changes to the hip. Moderate degenerative changes of the lumbar spine.   Prior studies available for comparison: no         Imaging Results (Most Recent)     None           Result Review :       No results found.           Assessment and Plan     DX: Lumbar DJD. Lumbar strain    Discussed the treatment plan with the patient.  Order for physical therapy given today. May consider MRI if symptoms persist. Prescription for tramadol and medrol dose pack given today. Plan to continue diclofenac. Work restrictions given today.     Call or return if worsening symptoms.    Follow Up     4 weeks.       Patient was given instructions and counseling regarding her condition or for health maintenance advice. Please see specific information pulled into the AVS if appropriate.     Scribed for Damian Llanes MD by Katlyn Cornejo.  09/17/21   08:33 EDT    I have personally performed the services described in this document as scribed by the above individual and it is both accurate and complete. Damian Llanes MD 09/18/21

## 2021-09-28 ENCOUNTER — TELEPHONE (OUTPATIENT)
Dept: FAMILY MEDICINE CLINIC | Facility: CLINIC | Age: 41
End: 2021-09-28

## 2021-09-28 DIAGNOSIS — M47.896 OTHER OSTEOARTHRITIS OF SPINE, LUMBAR REGION: ICD-10-CM

## 2021-09-28 DIAGNOSIS — M25.552 LEFT HIP PAIN: Primary | ICD-10-CM

## 2021-09-28 RX ORDER — AMITRIPTYLINE HYDROCHLORIDE 25 MG/1
25 TABLET, FILM COATED ORAL NIGHTLY
Qty: 30 TABLET | Refills: 0 | Status: SHIPPED | OUTPATIENT
Start: 2021-09-28

## 2021-09-28 NOTE — TELEPHONE ENCOUNTER
Caller: Leonie Bates    Relationship: Self    Best call back number: [phone]    What medication are you requesting: SOMETHING FOR PAIN     What are your current symptoms: LOWER BACK PAIN AND HIP PAIN     How long have you been experiencing symptoms: STARTED ON  09/11/21     Have you had these symptoms before:    [] Yes  [x] No    Have you been treated for these symptoms before:   [] Yes  [x] No    If a prescription is needed, what is your preferred pharmacy and phone number: [id number] - [place], [place] - [address] AT [facility] [phone] [facility] [phone]      Additional notes: PLEASE CALL AND ADVISE

## 2021-09-28 NOTE — TELEPHONE ENCOUNTER
Eliseo said to the patient to try amitriptyline 25 MG at night. Medication sent and patient is aware.

## 2021-09-28 NOTE — TELEPHONE ENCOUNTER
Spoke with patient she is having left hip pain and lower back pain. She went to the ER on 9/11/21 and was prescribed Flexeril 10 mg  which did not touch the pain and made her sleepy. She saw Dr. Llanes on 9/17/21 and was prescribed Tramadol 50 MG every 4 hours which didn't touch the pain. She is starting PT for this and is seeing a chiropractor.     No allergies  [facility]

## 2021-10-15 ENCOUNTER — HOSPITAL ENCOUNTER (OUTPATIENT)
Dept: MRI IMAGING | Facility: HOSPITAL | Age: 41
Discharge: HOME | End: 2021-10-15
Admitting: ORTHOPAEDIC SURGERY

## 2021-10-15 DIAGNOSIS — M25.552 LEFT HIP PAIN: ICD-10-CM

## 2021-10-15 DIAGNOSIS — M47.896 OTHER OSTEOARTHRITIS OF SPINE, LUMBAR REGION: ICD-10-CM

## 2021-10-15 PROCEDURE — 72148 MRI LUMBAR SPINE W/O DYE: CPT

## 2021-10-18 ENCOUNTER — OFFICE VISIT (OUTPATIENT)
Dept: ORTHOPEDIC SURGERY | Facility: CLINIC | Age: 41
End: 2021-10-18

## 2021-10-18 VITALS — BODY MASS INDEX: 44.12 KG/M2 | WEIGHT: 258.4 LBS | HEIGHT: 64 IN | HEART RATE: 93 BPM | OXYGEN SATURATION: 97 %

## 2021-10-18 DIAGNOSIS — S39.012A STRAIN OF LUMBAR REGION, INITIAL ENCOUNTER: ICD-10-CM

## 2021-10-18 DIAGNOSIS — M47.896 OTHER OSTEOARTHRITIS OF SPINE, LUMBAR REGION: ICD-10-CM

## 2021-10-18 DIAGNOSIS — M25.552 LEFT HIP PAIN: Primary | ICD-10-CM

## 2021-10-18 PROCEDURE — 99213 OFFICE O/P EST LOW 20 MIN: CPT | Performed by: PHYSICIAN ASSISTANT

## 2021-10-27 ENCOUNTER — OFFICE VISIT (OUTPATIENT)
Dept: FAMILY MEDICINE CLINIC | Facility: CLINIC | Age: 41
End: 2021-10-27

## 2021-10-27 VITALS
TEMPERATURE: 96.7 F | HEIGHT: 64 IN | BODY MASS INDEX: 43.57 KG/M2 | SYSTOLIC BLOOD PRESSURE: 120 MMHG | HEART RATE: 106 BPM | WEIGHT: 255.2 LBS | OXYGEN SATURATION: 97 % | DIASTOLIC BLOOD PRESSURE: 86 MMHG

## 2021-10-27 DIAGNOSIS — Z12.4 SCREENING FOR CERVICAL CANCER: Primary | ICD-10-CM

## 2021-10-27 DIAGNOSIS — F31.9 BIPOLAR AFFECTIVE DISORDER, REMISSION STATUS UNSPECIFIED (HCC): ICD-10-CM

## 2021-10-27 DIAGNOSIS — Z23 NEED FOR INFLUENZA VACCINATION: ICD-10-CM

## 2021-10-27 PROCEDURE — 90471 IMMUNIZATION ADMIN: CPT | Performed by: NURSE PRACTITIONER

## 2021-10-27 PROCEDURE — 88175 CYTOPATH C/V AUTO FLUID REDO: CPT | Performed by: NURSE PRACTITIONER

## 2021-10-27 PROCEDURE — 90686 IIV4 VACC NO PRSV 0.5 ML IM: CPT | Performed by: NURSE PRACTITIONER

## 2021-10-27 PROCEDURE — G0123 SCREEN CERV/VAG THIN LAYER: HCPCS | Performed by: NURSE PRACTITIONER

## 2021-10-27 PROCEDURE — 99396 PREV VISIT EST AGE 40-64: CPT | Performed by: NURSE PRACTITIONER

## 2021-10-27 NOTE — PROGRESS NOTES
"Chief Complaint  Gynecologic Exam    Subjective          Leonie Bates presents to [facility]  Patient presents to the office today for routine gynecological exam.  Patient denies any concerns or complaints at this time.  She does state that she is needing discuss her Vraylar.  States that the medication is working very well but states that her previous insurance was not covering it.  Patient does state that she recently acquired different insurance.  I did give patient a coupon card due to her having commercial insurance.  I explained to the patient if she has troubles filling the prescription to contact the office and we would help.  Patient does state that she has been having low back pain and was seen at urgent care.  Patient is also had an MRI and referred to Dr. Mckeon.  She has an appointment on January 11 and she has been off work.  Patient states that she feels that she can work but she would like a note for work restrictions not lift anything greater than 30 pounds.      Objective   Vital Signs:   /86 (BP Location: Right arm, Patient Position: Sitting, Cuff Size: Adult)   Pulse 106   Temp 96.7 °F (35.9 °C) (Temporal)   Ht 162.6 cm (64\")   Wt 116 kg (255 lb 3.2 oz)   SpO2 97%   BMI 43.80 kg/m²     Physical Exam  Vitals reviewed. Exam conducted with a chaperone present.   Constitutional:       Appearance: Normal appearance.   Cardiovascular:      Rate and Rhythm: Normal rate and regular rhythm.      Heart sounds: Normal heart sounds, S1 normal and S2 normal. No murmur heard.      Pulmonary:      Effort: Pulmonary effort is normal. No respiratory distress.      Breath sounds: Normal breath sounds.   Genitourinary:     General: Normal vulva.      Exam position: Lithotomy position.      Vagina: Normal.      Cervix: Normal.      Adnexa: Right adnexa normal and left adnexa normal.      Comments: Small amount of blood noted in the vaginal vault.  Recent menses per " patient   Skin:     General: Skin is warm and dry.   Neurological:      Mental Status: She is alert and oriented to person, place, and time.   Psychiatric:         Attention and Perception: Attention normal.         Mood and Affect: Mood normal.         Behavior: Behavior normal.        Result Review :                Assessment and Plan    Diagnoses and all orders for this visit:    1. Screening for cervical cancer (Primary)  -     IGP,rfx Aptima HPV All Pth; Future  -     IGP,rfx Aptima HPV All Pth    2. Need for influenza vaccination  -     FluLaval/Fluarix/Fluzone >6 Months ([id number])    3. Bipolar affective disorder, remission status unspecified (HCC)  Comments:  will attempt to restart  Orders:  -     Cariprazine HCl (VRAYLAR) 1.5 MG capsule capsule; Take 1 capsule by mouth Daily.  Dispense: 90 capsule; Refill: 1    Preventative counseling includes healthy diet and exercise.  Also discussed breast cancer screenings as well as self breast exams    Follow Up   Return in about 6 months (around 4/27/2022) for Recheck.  Patient was given instructions and counseling regarding her condition or for health maintenance advice. Please see specific information pulled into the AVS if appropriate.

## 2021-10-29 ENCOUNTER — TELEPHONE (OUTPATIENT)
Dept: FAMILY MEDICINE CLINIC | Facility: CLINIC | Age: 41
End: 2021-10-29

## 2021-10-29 NOTE — TELEPHONE ENCOUNTER
Patient called and stated Eliseo was going to send in a prescription for Vraylar but she checked and it is still not at the pharmacy.

## 2021-10-29 NOTE — TELEPHONE ENCOUNTER
Spoke with pt informed that medication was sent on 10/27 and she should call pharmacy back or tell the pharmacy to give us a call if any questions.

## 2021-11-01 ENCOUNTER — TELEPHONE (OUTPATIENT)
Dept: FAMILY MEDICINE CLINIC | Facility: CLINIC | Age: 41
End: 2021-11-01

## 2021-11-01 LAB
CONV .: NORMAL
CYTOLOGIST CVX/VAG CYTO: NORMAL
CYTOLOGY CVX/VAG DOC CYTO: NORMAL
CYTOLOGY CVX/VAG DOC THIN PREP: NORMAL
DX ICD CODE: NORMAL
HIV 1 & 2 AB SER-IMP: NORMAL
OTHER STN SPEC: NORMAL
STAT OF ADQ CVX/VAG CYTO-IMP: NORMAL

## 2021-11-05 DIAGNOSIS — F31.9 BIPOLAR AFFECTIVE DISORDER, REMISSION STATUS UNSPECIFIED (HCC): ICD-10-CM

## 2021-11-05 RX ORDER — SERTRALINE HYDROCHLORIDE 100 MG/1
TABLET, FILM COATED ORAL
Qty: 30 TABLET | Refills: 1 | Status: SHIPPED | OUTPATIENT
Start: 2021-11-05 | End: 2022-01-03

## 2021-12-31 DIAGNOSIS — F31.9 BIPOLAR AFFECTIVE DISORDER, REMISSION STATUS UNSPECIFIED: ICD-10-CM

## 2022-01-03 RX ORDER — SERTRALINE HYDROCHLORIDE 100 MG/1
TABLET, FILM COATED ORAL
Qty: 30 TABLET | Refills: 1 | Status: SHIPPED | OUTPATIENT
Start: 2022-01-03 | End: 2022-02-01

## 2022-02-01 DIAGNOSIS — F31.9 BIPOLAR AFFECTIVE DISORDER, REMISSION STATUS UNSPECIFIED: ICD-10-CM

## 2022-02-01 RX ORDER — SERTRALINE HYDROCHLORIDE 100 MG/1
TABLET, FILM COATED ORAL
Qty: 30 TABLET | Refills: 3 | Status: SHIPPED | OUTPATIENT
Start: 2022-02-01 | End: 2022-08-15 | Stop reason: SDUPTHER

## 2022-02-14 DIAGNOSIS — F31.9 BIPOLAR AFFECTIVE DISORDER, REMISSION STATUS UNSPECIFIED: ICD-10-CM

## 2022-07-22 ENCOUNTER — TRANSCRIBE ORDERS (OUTPATIENT)
Dept: ADMINISTRATIVE | Facility: HOSPITAL | Age: 42
End: 2022-07-22

## 2022-07-22 DIAGNOSIS — T07.XXXA MULTIPLE TRAUMA: Primary | ICD-10-CM

## 2022-07-27 ENCOUNTER — HOSPITAL ENCOUNTER (OUTPATIENT)
Dept: INFUSION THERAPY | Facility: HOSPITAL | Age: 42
Setting detail: INFUSION SERIES
Discharge: HOME | End: 2022-07-27

## 2022-07-27 VITALS
HEART RATE: 102 BPM | DIASTOLIC BLOOD PRESSURE: 61 MMHG | RESPIRATION RATE: 20 BRPM | HEIGHT: 64 IN | SYSTOLIC BLOOD PRESSURE: 112 MMHG | BODY MASS INDEX: 43.66 KG/M2 | TEMPERATURE: 98.1 F | OXYGEN SATURATION: 95 % | WEIGHT: 255.73 LBS

## 2022-07-27 DIAGNOSIS — T14.8XXD DELAYED HEALING OF TRAUMATIC WOUND: Primary | ICD-10-CM

## 2022-07-27 PROCEDURE — 97605 NEG PRS WND THER DME<=50SQCM: CPT

## 2022-07-27 NOTE — ADDENDUM NOTE
Encounter addended by: Stuart Gordillo RN on: 7/27/2022 3:45 PM   Actions taken: LDA properties accepted

## 2022-07-28 ENCOUNTER — TRANSCRIBE ORDERS (OUTPATIENT)
Dept: PHYSICAL THERAPY | Facility: CLINIC | Age: 42
End: 2022-07-28

## 2022-07-28 DIAGNOSIS — S22.43XA CLOSED FRACTURE OF MULTIPLE RIBS OF BOTH SIDES, INITIAL ENCOUNTER: ICD-10-CM

## 2022-07-28 DIAGNOSIS — S82.871A CLOSED DISPLACED PILON FRACTURE OF RIGHT TIBIA, INITIAL ENCOUNTER: Primary | ICD-10-CM

## 2022-07-28 DIAGNOSIS — S22.019A CLOSED FRACTURE OF FIRST THORACIC VERTEBRA, UNSPECIFIED FRACTURE MORPHOLOGY, INITIAL ENCOUNTER: ICD-10-CM

## 2022-07-29 ENCOUNTER — HOSPITAL ENCOUNTER (OUTPATIENT)
Dept: INFUSION THERAPY | Facility: HOSPITAL | Age: 42
Setting detail: INFUSION SERIES
Discharge: HOME | End: 2022-07-29

## 2022-07-29 VITALS
HEART RATE: 117 BPM | TEMPERATURE: 98.3 F | SYSTOLIC BLOOD PRESSURE: 119 MMHG | OXYGEN SATURATION: 98 % | DIASTOLIC BLOOD PRESSURE: 83 MMHG | RESPIRATION RATE: 20 BRPM

## 2022-07-29 DIAGNOSIS — T14.8XXD DELAYED HEALING OF TRAUMATIC WOUND: Primary | ICD-10-CM

## 2022-07-29 PROCEDURE — G0463 HOSPITAL OUTPT CLINIC VISIT: HCPCS

## 2022-07-29 NOTE — ADDENDUM NOTE
Encounter addended by: Stuart Gordillo RN on: 7/29/2022 10:43 AM   Actions taken: Flowsheet accepted

## 2022-07-29 NOTE — CODE DOCUMENTATION
Seen today for wound VAC dressing change. Tunneling noted greater than 15 cm at 4 o'clock and unable to feel end of tunnel. Wound VAC not reapplied related to increase depth into wound. Reached out to provider at [facility] appointment given to be seen on Tuesday at 0945. To continue wet to moist daily dressing changes until then. Patient states spouse had been performing them on her other abdominal wound and competent to perform in the absence of [facility].

## 2022-08-02 RX ORDER — GABAPENTIN 100 MG/1
200 CAPSULE ORAL EVERY 8 HOURS
COMMUNITY
Start: 2022-07-25 | End: 2022-08-15 | Stop reason: SDUPTHER

## 2022-08-02 RX ORDER — HYDROXYZINE HYDROCHLORIDE 25 MG/1
25 TABLET, FILM COATED ORAL EVERY 6 HOURS PRN
Qty: 90 TABLET | Refills: 0 | Status: SHIPPED | OUTPATIENT
Start: 2022-08-02 | End: 2022-08-22

## 2022-08-02 RX ORDER — HYDROXYZINE HYDROCHLORIDE 25 MG/1
25 TABLET, FILM COATED ORAL EVERY 6 HOURS PRN
COMMUNITY
Start: 2022-07-25 | End: 2022-08-02 | Stop reason: SDUPTHER

## 2022-08-02 RX ORDER — METHOCARBAMOL 500 MG/1
1000 TABLET, FILM COATED ORAL EVERY 8 HOURS PRN
COMMUNITY
Start: 2022-07-25 | End: 2022-08-09 | Stop reason: SDUPTHER

## 2022-08-02 RX ORDER — ERGOCALCIFEROL 1.25 MG/1
50000 CAPSULE ORAL
COMMUNITY
Start: 2022-07-25
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
## 2022-08-03 ENCOUNTER — HOSPITAL ENCOUNTER (OUTPATIENT)
Dept: INFUSION THERAPY | Facility: HOSPITAL | Age: 42
Setting detail: INFUSION SERIES
Discharge: HOME | End: 2022-08-03

## 2022-08-03 VITALS
SYSTOLIC BLOOD PRESSURE: 105 MMHG | HEART RATE: 131 BPM | DIASTOLIC BLOOD PRESSURE: 75 MMHG | RESPIRATION RATE: 20 BRPM | OXYGEN SATURATION: 97 % | TEMPERATURE: 98.2 F

## 2022-08-03 DIAGNOSIS — T14.8XXD DELAYED HEALING OF TRAUMATIC WOUND: Primary | ICD-10-CM

## 2022-08-03 PROCEDURE — 97605 NEG PRS WND THER DME<=50SQCM: CPT

## 2022-08-04 ENCOUNTER — TREATMENT (OUTPATIENT)
Dept: PHYSICAL THERAPY | Facility: CLINIC | Age: 42
End: 2022-08-04

## 2022-08-04 DIAGNOSIS — S52.501D CLOSED FRACTURE OF DISTAL END OF RIGHT RADIUS WITH ROUTINE HEALING, UNSPECIFIED FRACTURE MORPHOLOGY, SUBSEQUENT ENCOUNTER: ICD-10-CM

## 2022-08-04 DIAGNOSIS — Z87.81 S/P ORIF (OPEN REDUCTION INTERNAL FIXATION) FRACTURE: ICD-10-CM

## 2022-08-04 DIAGNOSIS — S82.871E TYPE I OR II OPEN DISPLACED PILON FRACTURE OF RIGHT TIBIA WITH ROUTINE HEALING, SUBSEQUENT ENCOUNTER: Primary | ICD-10-CM

## 2022-08-04 DIAGNOSIS — R26.9 GAIT DISTURBANCE: ICD-10-CM

## 2022-08-04 DIAGNOSIS — Z98.890 S/P ORIF (OPEN REDUCTION INTERNAL FIXATION) FRACTURE: ICD-10-CM

## 2022-08-04 PROCEDURE — 97110 THERAPEUTIC EXERCISES: CPT | Performed by: PHYSICAL THERAPIST

## 2022-08-04 PROCEDURE — 97163 PT EVAL HIGH COMPLEX 45 MIN: CPT | Performed by: PHYSICAL THERAPIST
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
## 2022-08-05 ENCOUNTER — HOSPITAL ENCOUNTER (OUTPATIENT)
Dept: INFUSION THERAPY | Facility: HOSPITAL | Age: 42
Setting detail: INFUSION SERIES
Discharge: HOME | End: 2022-08-05

## 2022-08-05 VITALS
OXYGEN SATURATION: 99 % | SYSTOLIC BLOOD PRESSURE: 123 MMHG | DIASTOLIC BLOOD PRESSURE: 84 MMHG | HEART RATE: 120 BPM | RESPIRATION RATE: 18 BRPM | TEMPERATURE: 98.2 F

## 2022-08-05 DIAGNOSIS — T14.8XXD DELAYED HEALING OF TRAUMATIC WOUND: Primary | ICD-10-CM

## 2022-08-05 PROCEDURE — 97605 NEG PRS WND THER DME<=50SQCM: CPT

## 2022-08-05 PROCEDURE — 97606 NEG PRS WND THER DME>50 SQCM: CPT

## 2022-08-08 ENCOUNTER — TREATMENT (OUTPATIENT)
Dept: PHYSICAL THERAPY | Facility: CLINIC | Age: 42
End: 2022-08-08

## 2022-08-08 ENCOUNTER — HOSPITAL ENCOUNTER (OUTPATIENT)
Dept: INFUSION THERAPY | Facility: HOSPITAL | Age: 42
Discharge: HOME | End: 2022-08-08
Admitting: PHYSICAL MEDICINE & REHABILITATION

## 2022-08-08 VITALS
OXYGEN SATURATION: 96 % | SYSTOLIC BLOOD PRESSURE: 105 MMHG | DIASTOLIC BLOOD PRESSURE: 74 MMHG | RESPIRATION RATE: 20 BRPM | TEMPERATURE: 98.2 F | HEART RATE: 109 BPM

## 2022-08-08 DIAGNOSIS — R26.9 GAIT DISTURBANCE: ICD-10-CM

## 2022-08-08 DIAGNOSIS — S52.501D CLOSED FRACTURE OF DISTAL END OF RIGHT RADIUS WITH ROUTINE HEALING, UNSPECIFIED FRACTURE MORPHOLOGY, SUBSEQUENT ENCOUNTER: ICD-10-CM

## 2022-08-08 DIAGNOSIS — Z87.81 S/P ORIF (OPEN REDUCTION INTERNAL FIXATION) FRACTURE: ICD-10-CM

## 2022-08-08 DIAGNOSIS — S82.871E TYPE I OR II OPEN DISPLACED PILON FRACTURE OF RIGHT TIBIA WITH ROUTINE HEALING, SUBSEQUENT ENCOUNTER: Primary | ICD-10-CM

## 2022-08-08 DIAGNOSIS — T14.8XXD DELAYED HEALING OF TRAUMATIC WOUND: Primary | ICD-10-CM

## 2022-08-08 DIAGNOSIS — Z98.890 S/P ORIF (OPEN REDUCTION INTERNAL FIXATION) FRACTURE: ICD-10-CM

## 2022-08-08 PROCEDURE — 97605 NEG PRS WND THER DME<=50SQCM: CPT

## 2022-08-08 PROCEDURE — G0463 HOSPITAL OUTPT CLINIC VISIT: HCPCS

## 2022-08-08 PROCEDURE — 97110 THERAPEUTIC EXERCISES: CPT | Performed by: PHYSICAL THERAPIST

## 2022-08-08 PROCEDURE — 97140 MANUAL THERAPY 1/> REGIONS: CPT | Performed by: PHYSICAL THERAPIST

## 2022-08-08 NOTE — PROGRESS NOTES
Physical Therapy Daily Treatment Note        Patient: Leonie Bates   : 1980  Diagnosis/ICD-10 Code:  Type I or II open displaced pilon fracture of right tibia with routine healing, subsequent encounter [S82.871E]  Referring practitioner: No ref. provider found  Date of Initial Visit: Type: THERAPY  Noted: 2022  Today's Date: 2022  Patient seen for 2 sessions             Subjective   Leonie Bates reports: pain being manageable with pain pills. States that she mainly uses boot when she is leaving the house.     Ankle: 6/10 pain   Wrist: 3/10 pain     Objective   Limited Range of Motion into Eversion and Inversion.     See Exercise, Manual, and Modality Logs for complete treatment.       Assessment/Plan  Leonie still experiencing increased R wrist and R ankle pain, although manageable with pain pills. Pt tolerated exercises well, increased discomfort with Eversion and Inversion. Pt would benefit from skilled PT to address Range of Motion  and Strength deficits, pain management and any concerns with ADLs.     Progress per Plan of Care           Timed:  Manual Therapy:    10     mins  63386;  Therapeutic Exercise:    20     mins  28946;     Neuromuscular Janice:        mins  80086;    Therapeutic Activity:          mins  04296;     Gait Training:           mins  01773;    Aquatic Therapy:          mins  32346;       Untimed:  Electrical Stimulation:         mins  36404 ( );  Mechanical Traction:         mins  62309;       Timed Treatment:   30   mins   Total Treatment:     30   mins      Electronically signed:   Mercedes Dougherty PTA  Physical Therapist Assistant  [facility] License #: [id number]

## 2022-08-09 RX ORDER — METHOCARBAMOL 500 MG/1
1000 TABLET, FILM COATED ORAL EVERY 8 HOURS PRN
Qty: 90 TABLET | Refills: 1 | Status: SHIPPED | OUTPATIENT
Start: 2022-08-09

## 2022-08-09 NOTE — TELEPHONE ENCOUNTER
Caller: Leonie Bates    Relationship: Self    Best call back number: [phone]    Requested Prescriptions:   Requested Prescriptions     Pending Prescriptions Disp Refills   • methocarbamol (ROBAXIN) 500 MG tablet       Sig: Take 2 tablets by mouth Every 8 (Eight) Hours As Needed.        Pharmacy where request should be sent: [id number] [place] [facility] [address] AT [facility] [phone] [facility] [phone]      Additional details provided by patient: PATIENT STATED: SHE HAS ONE DAY OF THIS MEDICATION REMAINING, IT WAS PROVIDED WHEN AT HOSPITAL, HER HOSPITAL FOLLOW UP IS NOT UNTIL 8/15/22.    Does the patient have less than a 3 day supply:  [x] Yes  [] No    Xenia ESPARZA   08/09/22 09:59 EDT

## 2022-08-10 ENCOUNTER — HOSPITAL ENCOUNTER (OUTPATIENT)
Dept: INFUSION THERAPY | Facility: HOSPITAL | Age: 42
Setting detail: INFUSION SERIES
Discharge: HOME | End: 2022-08-10

## 2022-08-10 VITALS
TEMPERATURE: 98.3 F | RESPIRATION RATE: 20 BRPM | OXYGEN SATURATION: 100 % | HEART RATE: 117 BPM | SYSTOLIC BLOOD PRESSURE: 114 MMHG | DIASTOLIC BLOOD PRESSURE: 82 MMHG

## 2022-08-10 DIAGNOSIS — T14.8XXD DELAYED HEALING OF TRAUMATIC WOUND: Primary | ICD-10-CM

## 2022-08-10 PROCEDURE — 97605 NEG PRS WND THER DME<=50SQCM: CPT

## 2022-08-10 PROCEDURE — G0463 HOSPITAL OUTPT CLINIC VISIT: HCPCS

## 2022-08-12 ENCOUNTER — HOSPITAL ENCOUNTER (OUTPATIENT)
Dept: INFUSION THERAPY | Facility: HOSPITAL | Age: 42
Setting detail: INFUSION SERIES
Discharge: HOME | End: 2022-08-12

## 2022-08-12 VITALS
TEMPERATURE: 98.4 F | SYSTOLIC BLOOD PRESSURE: 106 MMHG | HEART RATE: 107 BPM | OXYGEN SATURATION: 100 % | DIASTOLIC BLOOD PRESSURE: 72 MMHG | RESPIRATION RATE: 16 BRPM

## 2022-08-12 DIAGNOSIS — T14.8XXD DELAYED HEALING OF TRAUMATIC WOUND: Primary | ICD-10-CM

## 2022-08-12 PROCEDURE — 97605 NEG PRS WND THER DME<=50SQCM: CPT

## 2022-08-13 ENCOUNTER — DOCUMENTATION (OUTPATIENT)
Dept: FAMILY MEDICINE CLINIC | Facility: CLINIC | Age: 42
End: 2022-08-13

## 2022-08-15 ENCOUNTER — HOSPITAL ENCOUNTER (OUTPATIENT)
Dept: INFUSION THERAPY | Facility: HOSPITAL | Age: 42
Setting detail: INFUSION SERIES
Discharge: HOME | End: 2022-08-15

## 2022-08-15 ENCOUNTER — OFFICE VISIT (OUTPATIENT)
Dept: FAMILY MEDICINE CLINIC | Facility: CLINIC | Age: 42
End: 2022-08-15

## 2022-08-15 VITALS
OXYGEN SATURATION: 96 % | SYSTOLIC BLOOD PRESSURE: 122 MMHG | TEMPERATURE: 98.2 F | DIASTOLIC BLOOD PRESSURE: 79 MMHG | RESPIRATION RATE: 20 BRPM | HEART RATE: 101 BPM

## 2022-08-15 VITALS
BODY MASS INDEX: 43.75 KG/M2 | OXYGEN SATURATION: 99 % | TEMPERATURE: 97.3 F | SYSTOLIC BLOOD PRESSURE: 111 MMHG | HEIGHT: 64 IN | DIASTOLIC BLOOD PRESSURE: 71 MMHG | HEART RATE: 100 BPM

## 2022-08-15 DIAGNOSIS — N63.0 BREAST NODULE: Primary | ICD-10-CM

## 2022-08-15 DIAGNOSIS — T14.8XXD DELAYED HEALING OF TRAUMATIC WOUND: Primary | ICD-10-CM

## 2022-08-15 DIAGNOSIS — F31.9 BIPOLAR AFFECTIVE DISORDER, REMISSION STATUS UNSPECIFIED: ICD-10-CM

## 2022-08-15 DIAGNOSIS — T14.8XXD DELAYED HEALING OF TRAUMATIC WOUND: ICD-10-CM

## 2022-08-15 DIAGNOSIS — Z79.899 MEDICATION MANAGEMENT: ICD-10-CM

## 2022-08-15 DIAGNOSIS — F31.60 BIPOLAR AFFECTIVE DISORDER, CURRENT EPISODE MIXED, CURRENT EPISODE SEVERITY UNSPECIFIED: ICD-10-CM

## 2022-08-15 DIAGNOSIS — S82.891S TYPE I OR II OPEN FRACTURE OF RIGHT ANKLE, SEQUELA: ICD-10-CM

## 2022-08-15 LAB
AMPHET+METHAMPHET UR QL: NEGATIVE
AMPHETAMINE INTERNAL CONTROL: ABNORMAL
AMPHETAMINES UR QL: NEGATIVE
BARBITURATE INTERNAL CONTROL: ABNORMAL
BARBITURATES UR QL SCN: NEGATIVE
BENZODIAZ UR QL SCN: NEGATIVE
BENZODIAZEPINE INTERNAL CONTROL: ABNORMAL
BUPRENORPHINE INTERNAL CONTROL: ABNORMAL
BUPRENORPHINE SERPL-MCNC: NEGATIVE NG/ML
CANNABINOIDS SERPL QL: NEGATIVE
COCAINE INTERNAL CONTROL: ABNORMAL
COCAINE UR QL: NEGATIVE
EXPIRATION DATE: ABNORMAL
Lab: ABNORMAL
MDMA (ECSTASY) INTERNAL CONTROL: ABNORMAL
MDMA UR QL SCN: NEGATIVE
METHADONE INTERNAL CONTROL: ABNORMAL
METHADONE UR QL SCN: NEGATIVE
METHAMPHETAMINE INTERNAL CONTROL: ABNORMAL
OPIATES INTERNAL CONTROL: ABNORMAL
OPIATES UR QL: NEGATIVE
OXYCODONE INTERNAL CONTROL: ABNORMAL
OXYCODONE UR QL SCN: POSITIVE
PCP UR QL SCN: NEGATIVE
PHENCYCLIDINE INTERNAL CONTROL: ABNORMAL
THC INTERNAL CONTROL: ABNORMAL

## 2022-08-15 PROCEDURE — G0463 HOSPITAL OUTPT CLINIC VISIT: HCPCS

## 2022-08-15 PROCEDURE — 99214 OFFICE O/P EST MOD 30 MIN: CPT | Performed by: NURSE PRACTITIONER

## 2022-08-15 PROCEDURE — 80305 DRUG TEST PRSMV DIR OPT OBS: CPT | Performed by: NURSE PRACTITIONER

## 2022-08-15 PROCEDURE — 97605 NEG PRS WND THER DME<=50SQCM: CPT

## 2022-08-15 RX ORDER — SERTRALINE HYDROCHLORIDE 100 MG/1
100 TABLET, FILM COATED ORAL DAILY
Qty: 90 TABLET | Refills: 1 | Status: SHIPPED | OUTPATIENT
Start: 2022-08-15

## 2022-08-15 RX ORDER — OXYCODONE HYDROCHLORIDE 10 MG/1
10 TABLET ORAL EVERY 6 HOURS PRN
Qty: 12 TABLET | Refills: 0 | Status: SHIPPED | OUTPATIENT
Start: 2022-08-15

## 2022-08-15 RX ORDER — OXYCODONE HYDROCHLORIDE 10 MG/1
TABLET ORAL
Qty: 60 EACH | Refills: 0 | Status: CANCELLED | OUTPATIENT
Start: 2022-08-15

## 2022-08-15 RX ORDER — GABAPENTIN 100 MG/1
200 CAPSULE ORAL EVERY 8 HOURS
Qty: 270 CAPSULE | Refills: 1 | Status: SHIPPED | OUTPATIENT
Start: 2022-08-15
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
## 2022-08-16 ENCOUNTER — TREATMENT (OUTPATIENT)
Dept: PHYSICAL THERAPY | Facility: CLINIC | Age: 42
End: 2022-08-16

## 2022-08-16 DIAGNOSIS — Z98.890 S/P ORIF (OPEN REDUCTION INTERNAL FIXATION) FRACTURE: ICD-10-CM

## 2022-08-16 DIAGNOSIS — Z87.81 S/P ORIF (OPEN REDUCTION INTERNAL FIXATION) FRACTURE: ICD-10-CM

## 2022-08-16 DIAGNOSIS — S82.871E TYPE I OR II OPEN DISPLACED PILON FRACTURE OF RIGHT TIBIA WITH ROUTINE HEALING, SUBSEQUENT ENCOUNTER: Primary | ICD-10-CM

## 2022-08-16 DIAGNOSIS — S52.501D CLOSED FRACTURE OF DISTAL END OF RIGHT RADIUS WITH ROUTINE HEALING, UNSPECIFIED FRACTURE MORPHOLOGY, SUBSEQUENT ENCOUNTER: ICD-10-CM

## 2022-08-16 DIAGNOSIS — R26.9 GAIT DISTURBANCE: ICD-10-CM

## 2022-08-16 PROCEDURE — 97110 THERAPEUTIC EXERCISES: CPT | Performed by: PHYSICAL THERAPIST

## 2022-08-16 PROCEDURE — 97530 THERAPEUTIC ACTIVITIES: CPT | Performed by: PHYSICAL THERAPIST

## 2022-08-16 NOTE — PROGRESS NOTES
Physical Therapy Daily Treatment Note      Patient: Leonie Bates   : 1980  Referring practitioner: Guerrero Elliott MD  Date of Initial Visit: Type: THERAPY  Noted: 2022  Today's Date: 2022  Patient seen for 3 sessions           Subjective Questionnaire:       Subjective Evaluation    History of Present Illness    Subjective comment: Pt reports the L side of foot from digits one and two and up the foot and laterally felt numb and last night she felt nerve pain in digits one and two.       Objective   See Exercise, Manual, and Modality Logs for complete treatment.       Assessment & Plan     Assessment    Assessment details: Pt was able to ambulate with STC and CGA.  Pt reported she has been practicing ambulation not using the arm rest.  Pt is able to use R hand at this time.  Pt's andkle is limited in         Visit Diagnoses:    ICD-10-CM ICD-9-CM   1. Type I or II open displaced pilon fracture of right tibia with routine healing, subsequent encounter  S82.871E V54.19   2. S/P ORIF (open reduction internal fixation) fracture  Z98.890 V45.89    Z87.81 V15.51   3. Closed fracture of distal end of right radius with routine healing, unspecified fracture morphology, subsequent encounter  S52.501D V54.12   4. Gait disturbance  R26.9 781.2       Progress per Plan of Care and Progress strengthening /stabilization /functional activity           Timed:  Manual Therapy:   8      mins  93931;  Therapeutic Exercise:    12     mins  08233;     Neuromuscular Janice:        mins  22861;    Therapeutic Activity:     10     mins  94930;     Gait Training:           mins  67373;     Ultrasound:          mins  39753;    Electrical Stimulation:         mins  07387 ( );  Aquatic Therapy          mins  35377    Untimed:  Electrical Stimulation:         mins  69001 ( );  Mechanical Traction:         mins  69701;     Timed Treatment:   30   mins   Total Treatment:     30   mins    Electronically signed    Sandy  SURENDRA Medeiros  Physical Therapist Assistant    [employer] license: [id number]

## 2022-08-17 ENCOUNTER — HOSPITAL ENCOUNTER (OUTPATIENT)
Dept: INFUSION THERAPY | Facility: HOSPITAL | Age: 42
Setting detail: INFUSION SERIES
Discharge: HOME | End: 2022-08-17

## 2022-08-17 ENCOUNTER — TREATMENT (OUTPATIENT)
Dept: PHYSICAL THERAPY | Facility: CLINIC | Age: 42
End: 2022-08-17

## 2022-08-17 VITALS
SYSTOLIC BLOOD PRESSURE: 112 MMHG | OXYGEN SATURATION: 98 % | TEMPERATURE: 98.4 F | DIASTOLIC BLOOD PRESSURE: 71 MMHG | HEART RATE: 101 BPM | RESPIRATION RATE: 20 BRPM

## 2022-08-17 DIAGNOSIS — T14.8XXD DELAYED HEALING OF TRAUMATIC WOUND: Primary | ICD-10-CM

## 2022-08-17 DIAGNOSIS — R26.9 GAIT DISTURBANCE: ICD-10-CM

## 2022-08-17 DIAGNOSIS — Z87.81 S/P ORIF (OPEN REDUCTION INTERNAL FIXATION) FRACTURE: ICD-10-CM

## 2022-08-17 DIAGNOSIS — Z98.890 S/P ORIF (OPEN REDUCTION INTERNAL FIXATION) FRACTURE: ICD-10-CM

## 2022-08-17 DIAGNOSIS — S52.501D CLOSED FRACTURE OF DISTAL END OF RIGHT RADIUS WITH ROUTINE HEALING, UNSPECIFIED FRACTURE MORPHOLOGY, SUBSEQUENT ENCOUNTER: ICD-10-CM

## 2022-08-17 DIAGNOSIS — S82.871E TYPE I OR II OPEN DISPLACED PILON FRACTURE OF RIGHT TIBIA WITH ROUTINE HEALING, SUBSEQUENT ENCOUNTER: Primary | ICD-10-CM

## 2022-08-17 PROCEDURE — 97116 GAIT TRAINING THERAPY: CPT | Performed by: PHYSICAL THERAPIST

## 2022-08-17 PROCEDURE — 97110 THERAPEUTIC EXERCISES: CPT | Performed by: PHYSICAL THERAPIST

## 2022-08-17 PROCEDURE — 97605 NEG PRS WND THER DME<=50SQCM: CPT

## 2022-08-17 PROCEDURE — G0463 HOSPITAL OUTPT CLINIC VISIT: HCPCS

## 2022-08-17 PROCEDURE — 97140 MANUAL THERAPY 1/> REGIONS: CPT | Performed by: PHYSICAL THERAPIST

## 2022-08-17 NOTE — PROGRESS NOTES
Physical Therapy Daily Treatment Note    Patient: Leonie Bates   : 1980  Diagnosis/ICD-10 Code:  Type I or II open displaced pilon fracture of right tibia with routine healing, subsequent encounter [S82.873M]  Referring practitioner: Guerrero Elliott MD  Date of Initial Visit: Type: THERAPY  Noted: 2022  Today's Date: 2022  Patient seen for 4 sessions           Subjective: Pt reporting she is feeling like she is progressing and doing well. She says she doesn't wear the boot when walking at home, but using the walker to help not put too much weight through her ankle. She took the platform off the walker and is putting weight through her wrist, not having any problems with this.     Objective   See Exercise, Manual, and Modality Logs for complete treatment.       Assessment/Plan: Pt tolerated today's session well as her ankle mobility is improving. She is tolerating weight bearing in the boot very well. She is putting weight through her wrist without issue using the walker, progressing to using a cane and improved mechanics. Pt would benefit from continued skilled therapy to address deficits. Progress per plan of care.             Manual Therapy:    12     mins  11855;  Therapeutic Exercise:    18     mins  43663;     Neuromuscular Janice:    0    mins  55483;    Therapeutic Activity:     0     mins  33759;     Gait Training:      10     mins  35801;     Ultrasound:     0     mins  11723;    Electrical Stimulation:    0     mins  93104 ( );  Dry Needling     0     mins self-pay;  Aquatic Therapy    0     mins  10820;  Mechanical Traction    0     mins  13117  Moist Heat     0     mins  No charge    Timed Treatment:   40   mins   Total Treatment:     40   mins    Barron Dougherty PT  Physical Therapist    Electronically signed 2022    [place] License: PT - [id number]

## 2022-08-19 ENCOUNTER — HOSPITAL ENCOUNTER (OUTPATIENT)
Dept: INFUSION THERAPY | Facility: HOSPITAL | Age: 42
Setting detail: INFUSION SERIES
Discharge: HOME | End: 2022-08-19

## 2022-08-19 VITALS
RESPIRATION RATE: 20 BRPM | DIASTOLIC BLOOD PRESSURE: 81 MMHG | SYSTOLIC BLOOD PRESSURE: 138 MMHG | TEMPERATURE: 98.2 F | HEART RATE: 114 BPM | OXYGEN SATURATION: 97 %

## 2022-08-19 DIAGNOSIS — T14.8XXD DELAYED HEALING OF TRAUMATIC WOUND: Primary | ICD-10-CM

## 2022-08-19 PROCEDURE — 97605 NEG PRS WND THER DME<=50SQCM: CPT

## 2022-08-22 ENCOUNTER — HOSPITAL ENCOUNTER (OUTPATIENT)
Dept: INFUSION THERAPY | Facility: HOSPITAL | Age: 42
Setting detail: INFUSION SERIES
Discharge: HOME | End: 2022-08-22

## 2022-08-22 VITALS
SYSTOLIC BLOOD PRESSURE: 134 MMHG | RESPIRATION RATE: 20 BRPM | DIASTOLIC BLOOD PRESSURE: 97 MMHG | TEMPERATURE: 98.2 F | OXYGEN SATURATION: 96 % | HEART RATE: 114 BPM

## 2022-08-22 DIAGNOSIS — T14.8XXD DELAYED HEALING OF TRAUMATIC WOUND: ICD-10-CM

## 2022-08-22 PROCEDURE — 97606 NEG PRS WND THER DME>50 SQCM: CPT

## 2022-08-22 PROCEDURE — 97605 NEG PRS WND THER DME<=50SQCM: CPT

## 2022-08-22 RX ORDER — HYDROXYZINE HYDROCHLORIDE 25 MG/1
TABLET, FILM COATED ORAL
Qty: 90 TABLET | Refills: 0 | Status: SHIPPED | OUTPATIENT
Start: 2022-08-22

## 2022-08-24 ENCOUNTER — TREATMENT (OUTPATIENT)
Dept: PHYSICAL THERAPY | Facility: CLINIC | Age: 42
End: 2022-08-24

## 2022-08-24 ENCOUNTER — HOSPITAL ENCOUNTER (OUTPATIENT)
Dept: INFUSION THERAPY | Facility: HOSPITAL | Age: 42
Setting detail: INFUSION SERIES
Discharge: HOME | End: 2022-08-24

## 2022-08-24 VITALS
OXYGEN SATURATION: 96 % | DIASTOLIC BLOOD PRESSURE: 83 MMHG | HEART RATE: 83 BPM | RESPIRATION RATE: 20 BRPM | TEMPERATURE: 98.3 F | SYSTOLIC BLOOD PRESSURE: 143 MMHG

## 2022-08-24 DIAGNOSIS — S82.871E TYPE I OR II OPEN DISPLACED PILON FRACTURE OF RIGHT TIBIA WITH ROUTINE HEALING, SUBSEQUENT ENCOUNTER: Primary | ICD-10-CM

## 2022-08-24 DIAGNOSIS — R26.9 GAIT DISTURBANCE: ICD-10-CM

## 2022-08-24 DIAGNOSIS — Z87.81 S/P ORIF (OPEN REDUCTION INTERNAL FIXATION) FRACTURE: ICD-10-CM

## 2022-08-24 DIAGNOSIS — Z98.890 S/P ORIF (OPEN REDUCTION INTERNAL FIXATION) FRACTURE: ICD-10-CM

## 2022-08-24 DIAGNOSIS — T14.8XXD DELAYED HEALING OF TRAUMATIC WOUND: Primary | ICD-10-CM

## 2022-08-24 DIAGNOSIS — S52.501D CLOSED FRACTURE OF DISTAL END OF RIGHT RADIUS WITH ROUTINE HEALING, UNSPECIFIED FRACTURE MORPHOLOGY, SUBSEQUENT ENCOUNTER: ICD-10-CM

## 2022-08-24 PROCEDURE — 97140 MANUAL THERAPY 1/> REGIONS: CPT | Performed by: PHYSICAL THERAPIST

## 2022-08-24 PROCEDURE — 97605 NEG PRS WND THER DME<=50SQCM: CPT

## 2022-08-24 PROCEDURE — 97530 THERAPEUTIC ACTIVITIES: CPT | Performed by: PHYSICAL THERAPIST

## 2022-08-24 PROCEDURE — G0463 HOSPITAL OUTPT CLINIC VISIT: HCPCS

## 2022-08-24 PROCEDURE — 97110 THERAPEUTIC EXERCISES: CPT | Performed by: PHYSICAL THERAPIST

## 2022-08-24 NOTE — PROGRESS NOTES
Physical Therapy Daily Treatment Note      Patient: Leonie Bates   : 1980  Referring practitioner: Guerrero Elliott MD  Date of Initial Visit: Type: THERAPY  Noted: 2022  Today's Date: 2022  Patient seen for 5 sessions           Subjective Questionnaire:       Subjective Evaluation    History of Present Illness    Subjective comment: Pt reported 4/10 ankle normal pain and 3/10 in wrist explaining the wrist pain is about the lowest it's ever been.Pain  Current pain ratin           Objective   See Exercise, Manual, and Modality Logs for complete treatment.       Assessment & Plan     Assessment    Assessment details: Pt is ambulating with STC wearing boot on R  And wrist tolerating wt thru it.  Pt  keeps RW available for a weak day.    Pt tolerated strengthening well today and her her ankle mobility is improving.  Pt would benefit from continued skilled therapy to address deficits.  Progress per plan of care.        Visit Diagnoses:    ICD-10-CM ICD-9-CM   1. Type I or II open displaced pilon fracture of right tibia with routine healing, subsequent encounter  S82.871E V54.19   2. S/P ORIF (open reduction internal fixation) fracture  Z98.890 V45.89    Z87.81 V15.51   3. Closed fracture of distal end of right radius with routine healing, unspecified fracture morphology, subsequent encounter  S52.501D V54.12   4. Gait disturbance  R26.9 781.2       Progress per Plan of Care and Progress strengthening /stabilization /functional activity           Timed:  Manual Therapy:    15     mins  67583;  Therapeutic Exercise:    22     mins  13733;     Neuromuscular Janice:        mins  36713;    Therapeutic Activity:     8     mins  77862;     Gait Training:           mins  42182;     Ultrasound:          mins  10842;    Electrical Stimulation:         mins  61001 ( );  Aquatic Therapy          mins  11743    Untimed:  Electrical Stimulation:         mins  50103 ( );  Mechanical Traction:          mins  10209;     Timed Treatment:   45   mins   Total Treatment:     45   mins    Electronically signed    Sandy Medeiros PTA  Physical Therapist Assistant    [employer] license: [id number]

## 2022-08-25 ENCOUNTER — HOSPITAL ENCOUNTER (OUTPATIENT)
Dept: ULTRASOUND IMAGING | Facility: HOSPITAL | Age: 42
Discharge: HOME | End: 2022-08-25

## 2022-08-25 ENCOUNTER — TREATMENT (OUTPATIENT)
Dept: PHYSICAL THERAPY | Facility: CLINIC | Age: 42
End: 2022-08-25

## 2022-08-25 ENCOUNTER — HOSPITAL ENCOUNTER (OUTPATIENT)
Dept: MAMMOGRAPHY | Facility: HOSPITAL | Age: 42
Discharge: HOME | End: 2022-08-25

## 2022-08-25 DIAGNOSIS — S52.501D CLOSED FRACTURE OF DISTAL END OF RIGHT RADIUS WITH ROUTINE HEALING, UNSPECIFIED FRACTURE MORPHOLOGY, SUBSEQUENT ENCOUNTER: ICD-10-CM

## 2022-08-25 DIAGNOSIS — N63.0 BREAST NODULE: ICD-10-CM

## 2022-08-25 DIAGNOSIS — Z98.890 S/P ORIF (OPEN REDUCTION INTERNAL FIXATION) FRACTURE: ICD-10-CM

## 2022-08-25 DIAGNOSIS — Z87.81 S/P ORIF (OPEN REDUCTION INTERNAL FIXATION) FRACTURE: ICD-10-CM

## 2022-08-25 DIAGNOSIS — R26.9 GAIT DISTURBANCE: ICD-10-CM

## 2022-08-25 DIAGNOSIS — S82.871E TYPE I OR II OPEN DISPLACED PILON FRACTURE OF RIGHT TIBIA WITH ROUTINE HEALING, SUBSEQUENT ENCOUNTER: Primary | ICD-10-CM

## 2022-08-25 PROCEDURE — 97530 THERAPEUTIC ACTIVITIES: CPT | Performed by: PHYSICAL THERAPIST

## 2022-08-25 PROCEDURE — 97110 THERAPEUTIC EXERCISES: CPT | Performed by: PHYSICAL THERAPIST

## 2022-08-25 PROCEDURE — G0279 TOMOSYNTHESIS, MAMMO: HCPCS

## 2022-08-25 PROCEDURE — 76642 ULTRASOUND BREAST LIMITED: CPT

## 2022-08-25 PROCEDURE — 97140 MANUAL THERAPY 1/> REGIONS: CPT | Performed by: PHYSICAL THERAPIST

## 2022-08-25 PROCEDURE — 77066 DX MAMMO INCL CAD BI: CPT

## 2022-08-25 NOTE — PROGRESS NOTES
Physical Therapy Daily Treatment Note    Patient: Leonie Bates   : 1980  Diagnosis/ICD-10 Code:  Type I or II open displaced pilon fracture of right tibia with routine healing, subsequent encounter [S82.871E]  Referring practitioner: Guerrero Elliott MD  Date of Initial Visit: Type: THERAPY  Noted: 2022  Today's Date: 2022  Patient seen for 6 sessions           Subjective: Pt reporting she has been doing very well, she admits to ambulating in her home without her boot, often uses her slippers. Pt reports pain is still minimal.     Objective   See Exercise, Manual, and Modality Logs for complete treatment.       Assessment/Plan Pt tolerated today's session well with ongoing progression strengthening and ROM in her ankle. Her wrist is also progressing very well. Pt would benefit from continued skilled therapy to address deficits. Progress per plan of care.             Manual Therapy:    14     mins  09129;  Therapeutic Exercise:    15     mins  51785;     Neuromuscular Janice:    0    mins  19425;    Therapeutic Activity:     12     mins  92122;     Gait Trainin     mins  14876;     Ultrasound:     0     mins  28485;    Electrical Stimulation:    0     mins  50328 ( );  Dry Needling     0     mins self-pay;  Aquatic Therapy    0     mins  01556;  Mechanical Traction    0     mins  04719  Moist Heat     0     mins  No charge    Timed Treatment:   41   mins   Total Treatment:     41   mins    Barron Dougherty PT  Physical Therapist    Electronically signed 2022    [place] License: PT - [id number]

## 2022-08-26 ENCOUNTER — HOSPITAL ENCOUNTER (OUTPATIENT)
Dept: INFUSION THERAPY | Facility: HOSPITAL | Age: 42
Setting detail: INFUSION SERIES
Discharge: HOME | End: 2022-08-26

## 2022-08-26 VITALS
OXYGEN SATURATION: 96 % | HEART RATE: 84 BPM | SYSTOLIC BLOOD PRESSURE: 131 MMHG | RESPIRATION RATE: 20 BRPM | TEMPERATURE: 98.5 F | DIASTOLIC BLOOD PRESSURE: 67 MMHG

## 2022-08-26 DIAGNOSIS — T14.8XXD DELAYED HEALING OF TRAUMATIC WOUND: Primary | ICD-10-CM

## 2022-08-26 PROCEDURE — G0463 HOSPITAL OUTPT CLINIC VISIT: HCPCS

## 2022-08-29 ENCOUNTER — HOSPITAL ENCOUNTER (OUTPATIENT)
Dept: INFUSION THERAPY | Facility: HOSPITAL | Age: 42
Setting detail: INFUSION SERIES
Discharge: HOME | End: 2022-08-29

## 2022-08-29 ENCOUNTER — TREATMENT (OUTPATIENT)
Dept: PHYSICAL THERAPY | Facility: CLINIC | Age: 42
End: 2022-08-29

## 2022-08-29 VITALS
RESPIRATION RATE: 20 BRPM | TEMPERATURE: 98.1 F | SYSTOLIC BLOOD PRESSURE: 120 MMHG | HEART RATE: 104 BPM | DIASTOLIC BLOOD PRESSURE: 83 MMHG | OXYGEN SATURATION: 96 %

## 2022-08-29 DIAGNOSIS — Z87.81 S/P ORIF (OPEN REDUCTION INTERNAL FIXATION) FRACTURE: ICD-10-CM

## 2022-08-29 DIAGNOSIS — Z98.890 S/P ORIF (OPEN REDUCTION INTERNAL FIXATION) FRACTURE: ICD-10-CM

## 2022-08-29 DIAGNOSIS — T14.8XXD DELAYED HEALING OF TRAUMATIC WOUND: Primary | ICD-10-CM

## 2022-08-29 DIAGNOSIS — S52.501D CLOSED FRACTURE OF DISTAL END OF RIGHT RADIUS WITH ROUTINE HEALING, UNSPECIFIED FRACTURE MORPHOLOGY, SUBSEQUENT ENCOUNTER: ICD-10-CM

## 2022-08-29 DIAGNOSIS — R26.9 GAIT DISTURBANCE: ICD-10-CM

## 2022-08-29 DIAGNOSIS — S82.871E TYPE I OR II OPEN DISPLACED PILON FRACTURE OF RIGHT TIBIA WITH ROUTINE HEALING, SUBSEQUENT ENCOUNTER: Primary | ICD-10-CM

## 2022-08-29 PROCEDURE — 97602 WOUND(S) CARE NON-SELECTIVE: CPT

## 2022-08-29 PROCEDURE — 97110 THERAPEUTIC EXERCISES: CPT | Performed by: PHYSICAL THERAPIST

## 2022-08-29 PROCEDURE — 97140 MANUAL THERAPY 1/> REGIONS: CPT | Performed by: PHYSICAL THERAPIST

## 2022-08-29 PROCEDURE — 97530 THERAPEUTIC ACTIVITIES: CPT | Performed by: PHYSICAL THERAPIST

## 2022-08-29 NOTE — PROGRESS NOTES
Physical Therapy Daily Treatment Note        Patient: Leonie Bates   : 1980  Diagnosis/ICD-10 Code:  Type I or II open displaced pilon fracture of right tibia with routine healing, subsequent encounter [S82.871E]  Referring practitioner: Guerrero Elliott MD  Date of Initial Visit: Type: THERAPY  Noted: 2022  Today's Date: 2022  Patient seen for 7 sessions             Subjective   Leonie Bates reports: ankle stays more uncomfortable than painful, the worse 3-/10 pain. States that the numbness in her toes is starting to go away.     Reports wrist doing better and better, doesn't really think about it and besides her hand writing not being great. States that she has been able to make her own eggs and sandwiches with the right hand.     Objective   Minimal discomfort medial ankle when performing ankle Inversion/Eversion.     See Exercise, Manual, and Modality Logs for complete treatment.       Assessment/Plan  Leonie progressing as evident by decreased overall wrist and ankle pain. Pt tolerated exercises and manual well, discomfort with Inversion and Eversion activities. Pt would benefit from skilled PT to address Range of Motion  and Strength deficits, pain management and any concerns with ADLs.       Progress per Plan of Care           Timed:  Manual Therapy:    15     mins  72886;  Therapeutic Exercise:    30     mins  91577;     Neuromuscular Janice:        mins  87534;    Therapeutic Activity:     10     mins  17687;     Gait Training:           mins  08518;    Aquatic Therapy:          mins  29461;       Untimed:  Electrical Stimulation:         mins  69397 ( );  Mechanical Traction:         mins  91775;       Timed Treatment:   55   mins   Total Treatment:     55   mins      Electronically signed:   Mercedes Dougherty PTA  Physical Therapist Assistant  [facility]ospit[facility] License #: [id number]

## 2022-08-29 NOTE — CODE DOCUMENTATION
Patient presented to department today for wound care and wound vac change. Patient did not want wound vac reapplied this visit. Requested the wet to dry dressing to be applied.

## 2022-08-31 ENCOUNTER — HOSPITAL ENCOUNTER (OUTPATIENT)
Dept: INFUSION THERAPY | Facility: HOSPITAL | Age: 42
Setting detail: INFUSION SERIES
Discharge: HOME | End: 2022-08-31

## 2022-08-31 ENCOUNTER — TREATMENT (OUTPATIENT)
Dept: PHYSICAL THERAPY | Facility: CLINIC | Age: 42
End: 2022-08-31

## 2022-08-31 VITALS
DIASTOLIC BLOOD PRESSURE: 78 MMHG | RESPIRATION RATE: 20 BRPM | OXYGEN SATURATION: 99 % | TEMPERATURE: 98.6 F | SYSTOLIC BLOOD PRESSURE: 120 MMHG | HEART RATE: 77 BPM

## 2022-08-31 DIAGNOSIS — Z98.890 S/P ORIF (OPEN REDUCTION INTERNAL FIXATION) FRACTURE: ICD-10-CM

## 2022-08-31 DIAGNOSIS — Z87.81 S/P ORIF (OPEN REDUCTION INTERNAL FIXATION) FRACTURE: ICD-10-CM

## 2022-08-31 DIAGNOSIS — S82.871E TYPE I OR II OPEN DISPLACED PILON FRACTURE OF RIGHT TIBIA WITH ROUTINE HEALING, SUBSEQUENT ENCOUNTER: Primary | ICD-10-CM

## 2022-08-31 DIAGNOSIS — S52.501D CLOSED FRACTURE OF DISTAL END OF RIGHT RADIUS WITH ROUTINE HEALING, UNSPECIFIED FRACTURE MORPHOLOGY, SUBSEQUENT ENCOUNTER: ICD-10-CM

## 2022-08-31 DIAGNOSIS — R26.9 GAIT DISTURBANCE: ICD-10-CM

## 2022-08-31 DIAGNOSIS — T14.8XXD DELAYED HEALING OF TRAUMATIC WOUND: Primary | ICD-10-CM

## 2022-08-31 PROCEDURE — 97602 WOUND(S) CARE NON-SELECTIVE: CPT

## 2022-08-31 PROCEDURE — 97110 THERAPEUTIC EXERCISES: CPT | Performed by: PHYSICAL THERAPIST

## 2022-08-31 PROCEDURE — G0463 HOSPITAL OUTPT CLINIC VISIT: HCPCS

## 2022-08-31 PROCEDURE — 97140 MANUAL THERAPY 1/> REGIONS: CPT | Performed by: PHYSICAL THERAPIST

## 2022-08-31 PROCEDURE — 97112 NEUROMUSCULAR REEDUCATION: CPT | Performed by: PHYSICAL THERAPIST

## 2022-08-31 PROCEDURE — 97530 THERAPEUTIC ACTIVITIES: CPT | Performed by: PHYSICAL THERAPIST

## 2022-08-31 NOTE — CODE DOCUMENTATION
Requested not to reapply wound VAC today. Wet to moist dressing change completed per order. Will follow-up with provider next Tuesday (scheduled appointment). States will mail wound VAC back to [facility] include [facility] beds at the [facility].

## 2022-08-31 NOTE — PROGRESS NOTES
"Physical Therapy Daily Treatment Note      Patient: Leonie Bates   : 1980  Referring practitioner: Guerrero Elliott MD  Date of Initial Visit: Type: THERAPY  Noted: 2022  Today's Date: 2022  Patient seen for 8 sessions           Subjective  Leonie Bates reports: \"I don't use the boot at home, only when I go out. Pain is maybe 3-4/10 when it gets bad but it is more tightness. I notice it if I do something different or a new activity. I do get real tired, went to work yesterday.\"     \"Left the wound vac off Monday.\"    Objective   Shanae brought her shoe today so had her take off cam boot and walk in tennis shoe/exercise entire visit.    See Exercise, Manual, and Modality Logs for complete treatment.       Assessment/Plan  Shanae is progressing with her strength and functional ability, evident with advancement in task difficulty and resistance.. She is to undergo reassessment next session to outline further needs for therapist directed care.    Visit Diagnoses:    ICD-10-CM ICD-9-CM   1. Type I or II open displaced pilon fracture of right tibia with routine healing, subsequent encounter  S82.871E V54.19   2. S/P ORIF (open reduction internal fixation) fracture  Z98.890 V45.89    Z87.81 V15.51   3. Closed fracture of distal end of right radius with routine healing, unspecified fracture morphology, subsequent encounter  S52.501D V54.12   4. Gait disturbance  R26.9 781.2       Progress per Plan of Care and Progress strengthening /stabilization /functional activity           Timed:  Manual Therapy:    11     mins  76936;  Therapeutic Exercise:    24     mins  87894;     Neuromuscular Janice:    8    mins  50998;    Therapeutic Activity:      15    mins  50694;     Gait Training:           mins  70842;     Ultrasound:          mins  00431;    Electrical Stimulation:         mins  78332 ( );  Aquatics  __   mins   77448    Untimed:  Electrical Stimulation:         mins  17291 ( );  Mechanical " Traction:         mins  98806;     Timed Treatment:   58   mins   Total Treatment:     58   mins    Electronically Signed:  Kendy Gore PTA  Physical Therapist Assistant    [facility] license [id number]

## 2022-09-02 ENCOUNTER — TREATMENT (OUTPATIENT)
Dept: PHYSICAL THERAPY | Facility: CLINIC | Age: 42
End: 2022-09-02

## 2022-09-02 DIAGNOSIS — R26.9 GAIT DISTURBANCE: ICD-10-CM

## 2022-09-02 DIAGNOSIS — S52.501D CLOSED FRACTURE OF DISTAL END OF RIGHT RADIUS WITH ROUTINE HEALING, UNSPECIFIED FRACTURE MORPHOLOGY, SUBSEQUENT ENCOUNTER: ICD-10-CM

## 2022-09-02 DIAGNOSIS — Z87.81 S/P ORIF (OPEN REDUCTION INTERNAL FIXATION) FRACTURE: ICD-10-CM

## 2022-09-02 DIAGNOSIS — S82.871E TYPE I OR II OPEN DISPLACED PILON FRACTURE OF RIGHT TIBIA WITH ROUTINE HEALING, SUBSEQUENT ENCOUNTER: Primary | ICD-10-CM

## 2022-09-02 DIAGNOSIS — Z98.890 S/P ORIF (OPEN REDUCTION INTERNAL FIXATION) FRACTURE: ICD-10-CM

## 2022-09-02 PROCEDURE — 97140 MANUAL THERAPY 1/> REGIONS: CPT | Performed by: PHYSICAL THERAPIST

## 2022-09-02 PROCEDURE — 97110 THERAPEUTIC EXERCISES: CPT | Performed by: PHYSICAL THERAPIST

## 2022-09-02 PROCEDURE — 97530 THERAPEUTIC ACTIVITIES: CPT | Performed by: PHYSICAL THERAPIST
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
## 2022-09-07 ENCOUNTER — TREATMENT (OUTPATIENT)
Dept: PHYSICAL THERAPY | Facility: CLINIC | Age: 42
End: 2022-09-07

## 2022-09-07 DIAGNOSIS — Z98.890 S/P ORIF (OPEN REDUCTION INTERNAL FIXATION) FRACTURE: ICD-10-CM

## 2022-09-07 DIAGNOSIS — Z87.81 S/P ORIF (OPEN REDUCTION INTERNAL FIXATION) FRACTURE: ICD-10-CM

## 2022-09-07 DIAGNOSIS — R26.9 GAIT DISTURBANCE: ICD-10-CM

## 2022-09-07 DIAGNOSIS — S52.501D CLOSED FRACTURE OF DISTAL END OF RIGHT RADIUS WITH ROUTINE HEALING, UNSPECIFIED FRACTURE MORPHOLOGY, SUBSEQUENT ENCOUNTER: ICD-10-CM

## 2022-09-07 DIAGNOSIS — S82.871E TYPE I OR II OPEN DISPLACED PILON FRACTURE OF RIGHT TIBIA WITH ROUTINE HEALING, SUBSEQUENT ENCOUNTER: Primary | ICD-10-CM

## 2022-09-07 PROCEDURE — 97110 THERAPEUTIC EXERCISES: CPT | Performed by: PHYSICAL THERAPIST

## 2022-09-07 PROCEDURE — 97530 THERAPEUTIC ACTIVITIES: CPT | Performed by: PHYSICAL THERAPIST

## 2022-09-07 NOTE — PROGRESS NOTES
Physical Therapy Daily Treatment Note        Patient: Leonie Bates   : 1980  Diagnosis/ICD-10 Code:  Type I or II open displaced pilon fracture of right tibia with routine healing, subsequent encounter [S82.871E]  Referring practitioner: Guerrero Elliott MD  Date of Initial Visit: Type: THERAPY  Noted: 2022  Today's Date: 2022  Patient seen for 10 sessions             Subjective   Leonie Bates reports: going to her MD yesterday and did a lot of walking to and from the car, states that she was out of breath by the time she got to her appt. Reports having to take an Oxi last night because she couldn't get comfortable.    States that she went to work for 2 hours yesterday and was up walking and sitting 50/50.    Objective   Minimal discomfort with SL stance on right LE.     See Exercise, Manual, and Modality Logs for complete treatment.       Assessment/Plan  Leonie progressing as evident by decreased overall knee and ankle pain. Pt tolerated exercises well, minimal discomfort with SL stance on RLE. Pt would benefit from skilled PT to address Range of Motion  and Strength deficits, pain management and any concerns with ADLs.    Progress per Plan of Care           Timed:  Manual Therapy:         mins  18185;  Therapeutic Exercise:    30     mins  89245;     Neuromuscular Janice:        mins  12546;    Therapeutic Activity:     25     mins  57926;     Gait Training:           mins  25706;    Aquatic Therapy:          mins  34182;       Untimed:  Electrical Stimulation:         mins  82391 ( );  Mechanical Traction:         mins  13336;       Timed Treatment:   55   mins   Total Treatment:     55   mins      Electronically signed:   Mercedes Dougherty PTA  Physical Therapist Assistant  [facility] License #: [id number]

## 2022-09-09 ENCOUNTER — TREATMENT (OUTPATIENT)
Dept: PHYSICAL THERAPY | Facility: CLINIC | Age: 42
End: 2022-09-09

## 2022-09-09 DIAGNOSIS — S82.871E TYPE I OR II OPEN DISPLACED PILON FRACTURE OF RIGHT TIBIA WITH ROUTINE HEALING, SUBSEQUENT ENCOUNTER: Primary | ICD-10-CM

## 2022-09-09 DIAGNOSIS — Z87.81 S/P ORIF (OPEN REDUCTION INTERNAL FIXATION) FRACTURE: ICD-10-CM

## 2022-09-09 DIAGNOSIS — Z98.890 S/P ORIF (OPEN REDUCTION INTERNAL FIXATION) FRACTURE: ICD-10-CM

## 2022-09-09 DIAGNOSIS — S52.501D CLOSED FRACTURE OF DISTAL END OF RIGHT RADIUS WITH ROUTINE HEALING, UNSPECIFIED FRACTURE MORPHOLOGY, SUBSEQUENT ENCOUNTER: ICD-10-CM

## 2022-09-09 DIAGNOSIS — R26.9 GAIT DISTURBANCE: ICD-10-CM

## 2022-09-09 PROCEDURE — 97110 THERAPEUTIC EXERCISES: CPT | Performed by: PHYSICAL THERAPIST

## 2022-09-09 PROCEDURE — 97530 THERAPEUTIC ACTIVITIES: CPT | Performed by: PHYSICAL THERAPIST

## 2022-09-09 NOTE — PROGRESS NOTES
Physical Therapy Daily Treatment Note        Patient: Leonie Bates   : 1980  Diagnosis/ICD-10 Code:  Type I or II open displaced pilon fracture of right tibia with routine healing, subsequent encounter [S82.871E]  Referring practitioner: Guerrero Elliott MD  Date of Initial Visit: Type: THERAPY  Noted: 2022  Today's Date: 2022  Patient seen for 11 sessions             Subjective   Leonie Bates reports: driving herself today, hasn't been on the highway but been driving around town a little bit since Wednesday. States that she went and walked around the grocery on Wednesday after therapy and by the time she was done her legs were really tired.     Objective  Minimal discomfort with theraband ankle Eversion.     See Exercise, Manual, and Modality Logs for complete treatment.       Assessment/Plan  Leonie progressing as evident by decreased overall leg pain. Pt tolerated exercises well, no complaints of increased pain or discomfort. Pt would benefit from skilled PT to address Range of Motion  and Strength deficits, pain management and any concerns with ADLs.       Progress per Plan of Care           Timed:  Manual Therapy:         mins  01247;  Therapeutic Exercise:    25     mins  12564;     Neuromuscular Janice:        mins  36736;    Therapeutic Activity:     30     mins  85273;     Gait Training:           mins  08283;    Aquatic Therapy:          mins  29297;       Untimed:  Electrical Stimulation:         mins  14790 ( );  Mechanical Traction:         mins  73200;       Timed Treatment:   55   mins   Total Treatment:     55   mins      Electronically signed:   Mercedes Dougherty PTA  Physical Therapist Assistant  [facility] License #: [id number]

## 2022-09-12 ENCOUNTER — TREATMENT (OUTPATIENT)
Dept: PHYSICAL THERAPY | Facility: CLINIC | Age: 42
End: 2022-09-12

## 2022-09-12 DIAGNOSIS — Z87.81 S/P ORIF (OPEN REDUCTION INTERNAL FIXATION) FRACTURE: ICD-10-CM

## 2022-09-12 DIAGNOSIS — S52.501D CLOSED FRACTURE OF DISTAL END OF RIGHT RADIUS WITH ROUTINE HEALING, UNSPECIFIED FRACTURE MORPHOLOGY, SUBSEQUENT ENCOUNTER: ICD-10-CM

## 2022-09-12 DIAGNOSIS — Z98.890 S/P ORIF (OPEN REDUCTION INTERNAL FIXATION) FRACTURE: ICD-10-CM

## 2022-09-12 DIAGNOSIS — R26.9 GAIT DISTURBANCE: ICD-10-CM

## 2022-09-12 DIAGNOSIS — S82.871E TYPE I OR II OPEN DISPLACED PILON FRACTURE OF RIGHT TIBIA WITH ROUTINE HEALING, SUBSEQUENT ENCOUNTER: Primary | ICD-10-CM

## 2022-09-12 PROCEDURE — 97110 THERAPEUTIC EXERCISES: CPT | Performed by: PHYSICAL THERAPIST

## 2022-09-12 PROCEDURE — 97530 THERAPEUTIC ACTIVITIES: CPT | Performed by: PHYSICAL THERAPIST

## 2022-09-12 NOTE — PROGRESS NOTES
Physical Therapy Daily Treatment Note        Patient: Leonie Bates   : 1980  Diagnosis/ICD-10 Code:  Type I or II open displaced pilon fracture of right tibia with routine healing, subsequent encounter [S82.871E]  Referring practitioner: Guerrero Elliott MD  Date of Initial Visit: Type: THERAPY  Noted: 2022  Today's Date: 2022  Patient seen for 12 sessions             Subjective   Leonie Bates reports: wearing compression stockings and having a little more pain in her opposite foot, almost like a bone spur type pain.     Objective   Minimal discomfort with ankle strengthening exercises.     See Exercise, Manual, and Modality Logs for complete treatment.       Assessment/Plan  Leonie progressing as evident by decreased overall knee and ankle pain. Pt tolerated exercises well, minimal discomfort with amkle strengthening exercises. Pt would benefit from skilled PT to address Range of Motion  and Strength deficits, pain management and any concerns with ADLs.       Progress per Plan of Care           Timed:  Manual Therapy:         mins  98845;  Therapeutic Exercise:    25     mins  68538;     Neuromuscular Janice:        mins  40098;    Therapeutic Activity:     30     mins  89790;     Gait Training:           mins  49545;    Aquatic Therapy:          mins  05520;       Untimed:  Electrical Stimulation:         mins  27648 ( );  Mechanical Traction:         mins  01427;       Timed Treatment:   55   mins   Total Treatment:     55   mins      Electronically signed:   Mercedes Dougherty PTA  Physical Therapist Assistant  [facility] License #: [id number]

## 2022-09-14 ENCOUNTER — TREATMENT (OUTPATIENT)
Dept: PHYSICAL THERAPY | Facility: CLINIC | Age: 42
End: 2022-09-14

## 2022-09-14 DIAGNOSIS — Z87.81 S/P ORIF (OPEN REDUCTION INTERNAL FIXATION) FRACTURE: ICD-10-CM

## 2022-09-14 DIAGNOSIS — S52.501D CLOSED FRACTURE OF DISTAL END OF RIGHT RADIUS WITH ROUTINE HEALING, UNSPECIFIED FRACTURE MORPHOLOGY, SUBSEQUENT ENCOUNTER: ICD-10-CM

## 2022-09-14 DIAGNOSIS — S82.871E TYPE I OR II OPEN DISPLACED PILON FRACTURE OF RIGHT TIBIA WITH ROUTINE HEALING, SUBSEQUENT ENCOUNTER: Primary | ICD-10-CM

## 2022-09-14 DIAGNOSIS — Z98.890 S/P ORIF (OPEN REDUCTION INTERNAL FIXATION) FRACTURE: ICD-10-CM

## 2022-09-14 DIAGNOSIS — R26.9 GAIT DISTURBANCE: ICD-10-CM

## 2022-09-14 PROCEDURE — 97530 THERAPEUTIC ACTIVITIES: CPT | Performed by: PHYSICAL THERAPIST

## 2022-09-14 PROCEDURE — 97110 THERAPEUTIC EXERCISES: CPT | Performed by: PHYSICAL THERAPIST

## 2022-09-14 PROCEDURE — 97140 MANUAL THERAPY 1/> REGIONS: CPT | Performed by: PHYSICAL THERAPIST

## 2022-09-14 NOTE — PROGRESS NOTES
Physical Therapy Daily Treatment Note    Patient: Leonie Bates   : 1980  Diagnosis/ICD-10 Code:  Type I or II open displaced pilon fracture of right tibia with routine healing, subsequent encounter [S82.871E]  Referring practitioner: Guerrero Elliott MD  Date of Initial Visit: Type: THERAPY  Noted: 2022  Today's Date: 2022  Patient seen for 13 sessions           Subjective: Pt reporting she is sore today from standing at work yesterday, she was there for 4 hours and was standing about 70% of the time she estimates. Had some swelling afterward, but the swelling is better today.      Objective   See Exercise, Manual, and Modality Logs for complete treatment.       Assessment/Plan: Pt tolerated today's session well, reduced weight bearing stress this visit due to her recent activity. Pt would benefit from continued skilled therapy to address deficits. Progress per plan of care.             Manual Therapy:    25     mins  86900;  Therapeutic Exercise:    12     mins  35278;     Neuromuscular Janice:    0    mins  23880;    Therapeutic Activity:     8     mins  40295;     Gait Trainin     mins  60131;     Ultrasound:     0     mins  16194;    Electrical Stimulation:    0     mins  39085 ( );  Dry Needling     0     mins self-pay;  Aquatic Therapy    0     mins  75816;  Mechanical Traction    0     mins  69238  Moist Heat     0     mins  No charge    Timed Treatment:   45   mins   Total Treatment:     45   mins    Barron Dougherty PT  Physical Therapist    Electronically signed 2022    [place] License: PT - [id number]

## 2022-09-19 ENCOUNTER — TREATMENT (OUTPATIENT)
Dept: PHYSICAL THERAPY | Facility: CLINIC | Age: 42
End: 2022-09-19

## 2022-09-19 DIAGNOSIS — S82.871E TYPE I OR II OPEN DISPLACED PILON FRACTURE OF RIGHT TIBIA WITH ROUTINE HEALING, SUBSEQUENT ENCOUNTER: Primary | ICD-10-CM

## 2022-09-19 DIAGNOSIS — R26.9 GAIT DISTURBANCE: ICD-10-CM

## 2022-09-19 DIAGNOSIS — Z87.81 S/P ORIF (OPEN REDUCTION INTERNAL FIXATION) FRACTURE: ICD-10-CM

## 2022-09-19 DIAGNOSIS — S52.501D CLOSED FRACTURE OF DISTAL END OF RIGHT RADIUS WITH ROUTINE HEALING, UNSPECIFIED FRACTURE MORPHOLOGY, SUBSEQUENT ENCOUNTER: ICD-10-CM

## 2022-09-19 DIAGNOSIS — Z98.890 S/P ORIF (OPEN REDUCTION INTERNAL FIXATION) FRACTURE: ICD-10-CM

## 2022-09-19 PROCEDURE — 97530 THERAPEUTIC ACTIVITIES: CPT | Performed by: PHYSICAL THERAPIST

## 2022-09-19 PROCEDURE — 97110 THERAPEUTIC EXERCISES: CPT | Performed by: PHYSICAL THERAPIST

## 2022-09-19 NOTE — PROGRESS NOTES
Physical Therapy Daily Treatment Note        Patient: Leonie Bates   : 1980  Diagnosis/ICD-10 Code:  Type I or II open displaced pilon fracture of right tibia with routine healing, subsequent encounter [S82.871E]  Referring practitioner: Guerrero Elliott MD  Date of Initial Visit: Type: THERAPY  Noted: 2022  Today's Date: 2022  Patient seen for 14 sessions             Subjective   Leonie Bates reports: leg feeling better. States that she has 2 different appt this week and hopes to schedule her ostomy bag to be reversed.   Able to go up stairs one after another, still has to do one step at a time coming down the stairs.     Objective   Increased difficulty descending stairs, especially leading with left LE.     See Exercise, Manual, and Modality Logs for complete treatment.       Assessment/Plan  Leonie progressing as evident by decreased overall knee and ankle pain. Pt tolerated exercises well, increased difficulty with stair training.. Pt would benefit from skilled PT to address Range of Motion  and Strength deficits, pain management and any concerns with ADLs.       Progress per Plan of Care           Timed:  Manual Therapy:         mins  29882;  Therapeutic Exercise:    30     mins  17206;     Neuromuscular Janice:        mins  93049;    Therapeutic Activity:     25     mins  07873;     Gait Training:           mins  11421;    Aquatic Therapy:          mins  78789;       Untimed:  Electrical Stimulation:         mins  35915 ( );  Mechanical Traction:         mins  55660;       Timed Treatment:   55   mins   Total Treatment:     55   mins      Electronically signed:   Mercedes Dougherty PTA  Physical Therapist Assistant  [place] [facility] License #: [id number]

## 2022-09-22 ENCOUNTER — TELEPHONE (OUTPATIENT)
Dept: FAMILY MEDICINE CLINIC | Facility: CLINIC | Age: 42
End: 2022-09-22

## 2022-09-22 NOTE — TELEPHONE ENCOUNTER
Patient is awaiting next shipment of ostomy supplies due to changes that have needed to be made to supplies. Patient is on her last ostomy pouch. She is requesting to know if there is any place around that orders can be sent to for her ostomy supplies that she could get as soon as possible.     [facility] that would arrive the next day, unaware of time of arrival, and that she will have to pay out of pocket for the supplies.     Patient would still like information if possible on locations that may have supplies in today.

## 2022-09-23 NOTE — TELEPHONE ENCOUNTER
I spoke with the patient, she was able to reach the manufacture and they sent her samples to hold her over while she is awaiting insurance approval

## 2022-09-28 ENCOUNTER — TREATMENT (OUTPATIENT)
Dept: PHYSICAL THERAPY | Facility: CLINIC | Age: 42
End: 2022-09-28

## 2022-09-28 DIAGNOSIS — Z98.890 S/P ORIF (OPEN REDUCTION INTERNAL FIXATION) FRACTURE: ICD-10-CM

## 2022-09-28 DIAGNOSIS — R26.9 GAIT DISTURBANCE: ICD-10-CM

## 2022-09-28 DIAGNOSIS — S52.501D CLOSED FRACTURE OF DISTAL END OF RIGHT RADIUS WITH ROUTINE HEALING, UNSPECIFIED FRACTURE MORPHOLOGY, SUBSEQUENT ENCOUNTER: ICD-10-CM

## 2022-09-28 DIAGNOSIS — S82.871E TYPE I OR II OPEN DISPLACED PILON FRACTURE OF RIGHT TIBIA WITH ROUTINE HEALING, SUBSEQUENT ENCOUNTER: Primary | ICD-10-CM

## 2022-09-28 DIAGNOSIS — Z87.81 S/P ORIF (OPEN REDUCTION INTERNAL FIXATION) FRACTURE: ICD-10-CM

## 2022-09-28 PROCEDURE — 97110 THERAPEUTIC EXERCISES: CPT | Performed by: PHYSICAL THERAPIST

## 2022-09-28 PROCEDURE — 97530 THERAPEUTIC ACTIVITIES: CPT | Performed by: PHYSICAL THERAPIST

## 2022-09-28 NOTE — PROGRESS NOTES
Physical Therapy Daily Treatment Note        Patient: Leonie Bates   : 1980  Diagnosis/ICD-10 Code:  Type I or II open displaced pilon fracture of right tibia with routine healing, subsequent encounter [S82.871E]  Referring practitioner: Guerrero Elliott MD  Date of Initial Visit: Type: THERAPY  Noted: 2022  Today's Date: 2022  Patient seen for 15 sessions             Subjective   Leonie Bates reports: going to MD last week and they said her ankle look great, amazed that she is up and walking without the boot on. States that they don't plan to reverse the ostomy at this time.     Reports that they just drove to [place] and back for a long weekend and her ribs were really bothering her; no issue with the ankle. States that both her feet will bother her first thing in the morning.    Objective   No complaints of increased pain or discomfort.    See Exercise, Manual, and Modality Logs for complete treatment.     Assessment/Plan  Leonie progressing as evident by decreased overall ankle pain. Pt tolerated exercises well, no complaints of increased pain or discomfort. Pt would benefit from skilled PT to address Range of Motion  and Strength deficits, pain management and any concerns with ADLs.       Progress per Plan of Care           Timed:  Manual Therapy:         mins  30111;  Therapeutic Exercise:    25     mins  40092;     Neuromuscular Janice:        mins  28196;    Therapeutic Activity:     30     mins  98167;     Gait Training:           mins  83318;    Aquatic Therapy:          mins  98706;       Untimed:  Electrical Stimulation:         mins  49417 ( );  Mechanical Traction:         mins  03704;       Timed Treatment:   55   mins   Total Treatment:     55   mins      Electronically signed:   Mercedes Dougherty PTA  Physical Therapist Assistant  [facility] License #: [id number]

## 2022-09-30 ENCOUNTER — TREATMENT (OUTPATIENT)
Dept: PHYSICAL THERAPY | Facility: CLINIC | Age: 42
End: 2022-09-30

## 2022-09-30 DIAGNOSIS — Z98.890 S/P ORIF (OPEN REDUCTION INTERNAL FIXATION) FRACTURE: ICD-10-CM

## 2022-09-30 DIAGNOSIS — S52.501D CLOSED FRACTURE OF DISTAL END OF RIGHT RADIUS WITH ROUTINE HEALING, UNSPECIFIED FRACTURE MORPHOLOGY, SUBSEQUENT ENCOUNTER: ICD-10-CM

## 2022-09-30 DIAGNOSIS — Z87.81 S/P ORIF (OPEN REDUCTION INTERNAL FIXATION) FRACTURE: ICD-10-CM

## 2022-09-30 DIAGNOSIS — S82.871E TYPE I OR II OPEN DISPLACED PILON FRACTURE OF RIGHT TIBIA WITH ROUTINE HEALING, SUBSEQUENT ENCOUNTER: Primary | ICD-10-CM

## 2022-09-30 DIAGNOSIS — R26.9 GAIT DISTURBANCE: ICD-10-CM

## 2022-09-30 PROCEDURE — 97530 THERAPEUTIC ACTIVITIES: CPT | Performed by: PHYSICAL THERAPIST

## 2022-09-30 PROCEDURE — 97110 THERAPEUTIC EXERCISES: CPT | Performed by: PHYSICAL THERAPIST

## 2022-09-30 NOTE — PROGRESS NOTES
Physical Therapy Daily Treatment Note        Patient: Leonie Bates   : 1980  Diagnosis/ICD-10 Code:  Type I or II open displaced pilon fracture of right tibia with routine healing, subsequent encounter [S82.871E]  Referring practitioner: Guerrero Elliott MD  Date of Initial Visit: Type: THERAPY  Noted: 2022  Today's Date: 2022  Patient seen for 16 sessions             Subjective   Leonie Bates reports: she's been walking to the gas station and back and it's about 1.5miles, feels pretty good. Bending over still bothers her, more from her ostomy bag and just a little because of the ankle.     Objective   No complaints of increased pain or discomfort.     See Exercise, Manual, and Modality Logs for complete treatment.       Assessment/Plan  Leonie progressing as evident by decreased overall wrist and ankle pain. Pt tolerated exercises well, no complaints of increased pain or discomfort. Pt to be discharged due to moving to [place], good understanding of HEP.     Progress per Plan of Care           Timed:  Manual Therapy:         mins  83758;  Therapeutic Exercise:    30     mins  01299;     Neuromuscular Janice:        mins  21307;    Therapeutic Activity:     25     mins  14916;     Gait Training:           mins  88803;    Aquatic Therapy:          mins  92322;       Untimed:  Electrical Stimulation:         mins  86840 ( );  Mechanical Traction:         mins  49765;       Timed Treatment:   55   mins   Total Treatment:     55   mins      Electronically signed:   Mercedes Dougherty PTA  Physical Therapist Assistant  [facility] License #: [id number]

## 2024-08-14 ENCOUNTER — DOCUMENTATION (OUTPATIENT)
Dept: PHYSICAL THERAPY | Facility: CLINIC | Age: 44
End: 2024-08-14
Payer: COMMERCIAL

## 2024-08-14 NOTE — PROGRESS NOTES
Outpatient Physical Therapy  [address] [facility] [address], [place] [address]      Discharge Summary  Discharge Summary from Physical Therapy Report      Dates  PT visit: 8/4/22-9/30/22  Number of Visits: 16       Goals: Partially Met    Discharge Plan: Continue with current home exercise program as instructed    Date of Discharge 8/14/2024      Electronically signed:   Mercedes Dougherty PTA  Physical Therapist Assistant  Angie AGOSTO License #: [id number]

## 2024-09-12 NOTE — TELEPHONE ENCOUNTER
----- Message from GABINO Grover sent at 11/1/2021  1:46 PM EDT -----  Normal Pap smear  
Sent via [employer]  
Detail Level: Detailed